# Patient Record
Sex: FEMALE | Race: WHITE | NOT HISPANIC OR LATINO | URBAN - METROPOLITAN AREA
[De-identification: names, ages, dates, MRNs, and addresses within clinical notes are randomized per-mention and may not be internally consistent; named-entity substitution may affect disease eponyms.]

---

## 2017-09-08 ENCOUNTER — APPOINTMENT (RX ONLY)
Dept: URBAN - METROPOLITAN AREA CLINIC 349 | Facility: CLINIC | Age: 77
Setting detail: DERMATOLOGY
End: 2017-09-08

## 2017-09-14 ENCOUNTER — APPOINTMENT (RX ONLY)
Dept: URBAN - METROPOLITAN AREA CLINIC 349 | Facility: CLINIC | Age: 77
Setting detail: DERMATOLOGY
End: 2017-09-14

## 2017-09-14 DIAGNOSIS — L72.8 OTHER FOLLICULAR CYSTS OF THE SKIN AND SUBCUTANEOUS TISSUE: ICD-10-CM

## 2017-09-14 DIAGNOSIS — L90.5 SCAR CONDITIONS AND FIBROSIS OF SKIN: ICD-10-CM

## 2017-09-14 DIAGNOSIS — D22 MELANOCYTIC NEVI: ICD-10-CM

## 2017-09-14 DIAGNOSIS — L82.1 OTHER SEBORRHEIC KERATOSIS: ICD-10-CM

## 2017-09-14 DIAGNOSIS — L72.0 EPIDERMAL CYST: ICD-10-CM

## 2017-09-14 PROBLEM — D22.61 MELANOCYTIC NEVI OF RIGHT UPPER LIMB, INCLUDING SHOULDER: Status: ACTIVE | Noted: 2017-09-14

## 2017-09-14 PROBLEM — D22.72 MELANOCYTIC NEVI OF LEFT LOWER LIMB, INCLUDING HIP: Status: ACTIVE | Noted: 2017-09-14

## 2017-09-14 PROBLEM — D22.5 MELANOCYTIC NEVI OF TRUNK: Status: ACTIVE | Noted: 2017-09-14

## 2017-09-14 PROBLEM — D22.62 MELANOCYTIC NEVI OF LEFT UPPER LIMB, INCLUDING SHOULDER: Status: ACTIVE | Noted: 2017-09-14

## 2017-09-14 PROBLEM — D22.71 MELANOCYTIC NEVI OF RIGHT LOWER LIMB, INCLUDING HIP: Status: ACTIVE | Noted: 2017-09-14

## 2017-09-14 PROCEDURE — ? COUNSELING

## 2017-09-14 PROCEDURE — 10060 I&D ABSCESS SIMPLE/SINGLE: CPT

## 2017-09-14 PROCEDURE — 99203 OFFICE O/P NEW LOW 30 MIN: CPT | Mod: 25

## 2017-09-14 PROCEDURE — ? INCISION AND DRAINAGE

## 2017-09-14 ASSESSMENT — LOCATION SIMPLE DESCRIPTION DERM
LOCATION SIMPLE: RIGHT FOREARM
LOCATION SIMPLE: LEFT UPPER BACK
LOCATION SIMPLE: LEFT POSTERIOR THIGH
LOCATION SIMPLE: LEFT FOREARM
LOCATION SIMPLE: RIGHT POSTERIOR THIGH
LOCATION SIMPLE: ABDOMEN
LOCATION SIMPLE: POSTERIOR SCALP
LOCATION SIMPLE: LEFT ELBOW
LOCATION SIMPLE: NECK
LOCATION SIMPLE: UPPER BACK
LOCATION SIMPLE: CHEST

## 2017-09-14 ASSESSMENT — LOCATION ZONE DERM
LOCATION ZONE: SCALP
LOCATION ZONE: NECK
LOCATION ZONE: ARM
LOCATION ZONE: LEG
LOCATION ZONE: TRUNK

## 2017-09-14 ASSESSMENT — LOCATION DETAILED DESCRIPTION DERM
LOCATION DETAILED: RIGHT PROXIMAL DORSAL FOREARM
LOCATION DETAILED: LEFT CENTRAL LATERAL NECK
LOCATION DETAILED: INFERIOR THORACIC SPINE
LOCATION DETAILED: LEFT SUPERIOR UPPER BACK
LOCATION DETAILED: STERNAL NOTCH
LOCATION DETAILED: RIGHT DISTAL POSTERIOR THIGH
LOCATION DETAILED: LEFT LATERAL ELBOW
LOCATION DETAILED: LEFT PROXIMAL DORSAL FOREARM
LOCATION DETAILED: LEFT OCCIPITAL SCALP
LOCATION DETAILED: LEFT DISTAL LATERAL POSTERIOR THIGH
LOCATION DETAILED: EPIGASTRIC SKIN

## 2017-09-14 NOTE — PROCEDURE: INCISION AND DRAINAGE
Render Postcare In Note?: Yes
Suture Text: The incision was partially closed with
Curette Text (Optional): After the contents were expressed a curette was used to partially remove the cyst wall.
Post-Care Instructions: I reviewed with the patient in detail post-care instructions. Patient should keep wound covered and call the office should any redness, pain, swelling or worsening occur.
Include Sutures?: No
Detail Level: Detailed
Anesthesia Volume In Cc: 0
Consent was obtained and risks were reviewed including but not limited to delayed wound healing, infection, need for multiple I and D's, and pain.
Dressing: pressure dressing
Epidermal Sutures: 4-0 Ethilon
Method: comedo extractor
Preparation Text: The area was prepped in the usual clean fashion.
Lesion Type: Cyst
Epidermal Closure: simple interrupted

## 2018-09-10 ENCOUNTER — APPOINTMENT (RX ONLY)
Dept: URBAN - METROPOLITAN AREA CLINIC 349 | Facility: CLINIC | Age: 78
Setting detail: DERMATOLOGY
End: 2018-09-10

## 2018-09-10 DIAGNOSIS — L82.0 INFLAMED SEBORRHEIC KERATOSIS: ICD-10-CM

## 2018-09-10 DIAGNOSIS — L91.8 OTHER HYPERTROPHIC DISORDERS OF THE SKIN: ICD-10-CM

## 2018-09-10 PROCEDURE — 88305 TISSUE EXAM BY PATHOLOGIST: CPT

## 2018-09-10 PROCEDURE — ? PATHOLOGY BILLING

## 2018-09-10 PROCEDURE — 11301 SHAVE SKIN LESION 0.6-1.0 CM: CPT

## 2018-09-10 PROCEDURE — 99213 OFFICE O/P EST LOW 20 MIN: CPT | Mod: 25

## 2018-09-10 PROCEDURE — ? SHAVE REMOVAL

## 2018-09-10 PROCEDURE — ? COUNSELING

## 2018-09-10 PROCEDURE — A4550 SURGICAL TRAYS: HCPCS

## 2018-09-10 ASSESSMENT — LOCATION DETAILED DESCRIPTION DERM
LOCATION DETAILED: LEFT AXILLARY VAULT
LOCATION DETAILED: LEFT AXILLARY TAIL OF BREAST

## 2018-09-10 ASSESSMENT — LOCATION SIMPLE DESCRIPTION DERM
LOCATION SIMPLE: LEFT AXILLARY VAULT
LOCATION SIMPLE: LEFT BREAST

## 2018-09-10 ASSESSMENT — LOCATION ZONE DERM
LOCATION ZONE: TRUNK
LOCATION ZONE: AXILLAE

## 2018-09-10 NOTE — PROCEDURE: SHAVE REMOVAL
Size Of Lesion In Cm (Required): 0.6
Hemostasis: Aluminum Chloride
Post-Care Instructions: I reviewed with the patient in detail post-care instructions. Patient is to keep the biopsy site dry overnight, and then apply Vaseline daily until healed. Patient may apply hydrogen peroxide soaks to remove any crusting. After the procedure, the patient was oriented to person, place, and time. Patient denied feeling dizzy, queasy, and and declined further observation after initial 5 minute observation time.
Detail Level: Detailed
Render Post-Care Instructions In Note?: yes
Wound Care: Vaseline
Anesthesia Volume In Cc: 1.5
Medical Necessity Clause: This procedure was medically necessary because the lesion has a history of change
Medical Necessity Information: It is in your best interest to select a reason for this procedure from the list below. All of these items fulfill various CMS LCD requirements except the new and changing color options.
Consent was obtained from the patient. The risks and benefits to therapy were discussed in detail. Specifically, the risks of infection, scarring, bleeding, prolonged wound healing, incomplete removal, allergy to anesthesia, nerve injury and recurrence were addressed. Prior to the procedure, the treatment site was clearly identified and confirmed by the patient. All components of Universal Protocol/PAUSE Rule completed.
Anesthesia Type: 1% lidocaine with 1:100,000 epinephrine and a 1:10 solution of 8.4% sodium bicarbonate
Accession #: [id number]
Bill [id number] For Specimen Handling/Conveyance To Laboratory?: no
Biopsy Method: Dermablade
Notification Instructions: Patient will be notified of biopsy results. However, patient instructed to call the office if not contacted within 2 weeks.
X Size Of Lesion In Cm (Optional): 0
Billing Type: Third-Party Bill

## 2018-09-10 NOTE — PROCEDURE: PATHOLOGY BILLING
Immunohistochemistry (28774 and [id number]) billing is not performed here. Please use the Immunohistochemistry Stain Billing plan to accomplish this. Immunohistochemistry ([id number] and [id number]) billing is not performed here. Please use the Immunohistochemistry Stain Billing plan to accomplish this.

## 2018-10-01 ENCOUNTER — APPOINTMENT (RX ONLY)
Dept: URBAN - METROPOLITAN AREA CLINIC 349 | Facility: CLINIC | Age: 78
Setting detail: DERMATOLOGY
End: 2018-10-01

## 2018-10-01 DIAGNOSIS — D22 MELANOCYTIC NEVI: ICD-10-CM | Status: STABLE

## 2018-10-01 DIAGNOSIS — L82.1 OTHER SEBORRHEIC KERATOSIS: ICD-10-CM

## 2018-10-01 PROBLEM — D22.5 MELANOCYTIC NEVI OF TRUNK: Status: ACTIVE | Noted: 2018-10-01

## 2018-10-01 PROCEDURE — ? COUNSELING

## 2018-10-01 PROCEDURE — 99213 OFFICE O/P EST LOW 20 MIN: CPT

## 2018-10-01 ASSESSMENT — LOCATION DETAILED DESCRIPTION DERM
LOCATION DETAILED: LEFT RIB CAGE
LOCATION DETAILED: RIGHT RIB CAGE
LOCATION DETAILED: INFERIOR THORACIC SPINE

## 2018-10-01 ASSESSMENT — LOCATION SIMPLE DESCRIPTION DERM
LOCATION SIMPLE: UPPER BACK
LOCATION SIMPLE: ABDOMEN

## 2018-10-01 ASSESSMENT — LOCATION ZONE DERM: LOCATION ZONE: TRUNK

## 2019-09-10 ENCOUNTER — APPOINTMENT (RX ONLY)
Dept: URBAN - METROPOLITAN AREA CLINIC 349 | Facility: CLINIC | Age: 79
Setting detail: DERMATOLOGY
End: 2019-09-10

## 2019-09-10 DIAGNOSIS — D22 MELANOCYTIC NEVI: ICD-10-CM | Status: STABLE

## 2019-09-10 DIAGNOSIS — L82.1 OTHER SEBORRHEIC KERATOSIS: ICD-10-CM

## 2019-09-10 PROBLEM — D22.5 MELANOCYTIC NEVI OF TRUNK: Status: ACTIVE | Noted: 2019-09-10

## 2019-09-10 PROCEDURE — 99213 OFFICE O/P EST LOW 20 MIN: CPT

## 2019-09-10 PROCEDURE — ? OTHER

## 2019-09-10 PROCEDURE — ? BODY PHOTOGRAPHY

## 2019-09-10 PROCEDURE — ? COUNSELING

## 2019-09-10 ASSESSMENT — LOCATION DETAILED DESCRIPTION DERM
LOCATION DETAILED: RIGHT INFERIOR MEDIAL UPPER BACK
LOCATION DETAILED: RIGHT LATERAL EYEBROW
LOCATION DETAILED: INFERIOR THORACIC SPINE
LOCATION DETAILED: RIGHT RIB CAGE
LOCATION DETAILED: RIGHT SUPERIOR MEDIAL UPPER BACK
LOCATION DETAILED: LEFT RIB CAGE

## 2019-09-10 ASSESSMENT — LOCATION ZONE DERM
LOCATION ZONE: TRUNK
LOCATION ZONE: FACE

## 2019-09-10 ASSESSMENT — LOCATION SIMPLE DESCRIPTION DERM
LOCATION SIMPLE: RIGHT UPPER BACK
LOCATION SIMPLE: UPPER BACK
LOCATION SIMPLE: ABDOMEN
LOCATION SIMPLE: RIGHT EYEBROW

## 2019-09-10 NOTE — PROCEDURE: OTHER
Note Text (......Xxx Chief Complaint.): This diagnosis correlates with the
Other (Free Text): Offered LN2, patient declined
Detail Level: Detailed

## 2019-09-10 NOTE — PROCEDURE: BODY PHOTOGRAPHY
Was The Entire Body Photographed (Cannot Bill Unless Entire Body Photographed)?: No
Reason For Photography: The patient is obtaining body photography to observe existing suspicious moles and or monitor for the appearance of any new lesions.
Number Of Photographs (Optional- Will Not Render If 0): 1
Consent: Written consent obtained, risks reviewed for whole body photography. Patient understands that photograph costs may not be covered by insurance, and patient is ultimately responsible for payment.
Detail Level: Zone
Whole Body Statement: The whole body was photographed today.

## 2020-09-10 ENCOUNTER — APPOINTMENT (RX ONLY)
Dept: URBAN - METROPOLITAN AREA CLINIC 349 | Facility: CLINIC | Age: 80
Setting detail: DERMATOLOGY
End: 2020-09-10

## 2020-09-10 DIAGNOSIS — D22 MELANOCYTIC NEVI: ICD-10-CM | Status: STABLE

## 2020-09-10 DIAGNOSIS — L82.1 OTHER SEBORRHEIC KERATOSIS: ICD-10-CM

## 2020-09-10 PROBLEM — D22.5 MELANOCYTIC NEVI OF TRUNK: Status: ACTIVE | Noted: 2020-09-10

## 2020-09-10 PROCEDURE — ? OTHER

## 2020-09-10 PROCEDURE — ? COUNSELING

## 2020-09-10 PROCEDURE — 99213 OFFICE O/P EST LOW 20 MIN: CPT

## 2020-09-10 PROCEDURE — ? MEDICAL PHOTOGRAPHY REVIEW

## 2020-09-10 ASSESSMENT — LOCATION ZONE DERM: LOCATION ZONE: TRUNK

## 2020-09-10 ASSESSMENT — LOCATION SIMPLE DESCRIPTION DERM
LOCATION SIMPLE: RIGHT UPPER BACK
LOCATION SIMPLE: CHEST
LOCATION SIMPLE: UPPER BACK

## 2020-09-10 ASSESSMENT — LOCATION DETAILED DESCRIPTION DERM
LOCATION DETAILED: INFERIOR THORACIC SPINE
LOCATION DETAILED: RIGHT INFERIOR MEDIAL UPPER BACK
LOCATION DETAILED: LOWER STERNUM

## 2020-09-10 NOTE — PROCEDURE: OTHER
Other (Free Text): offered LN2, patient declined.
Detail Level: Detailed
Note Text (......Xxx Chief Complaint.): This diagnosis correlates with the

## 2021-09-13 ENCOUNTER — APPOINTMENT (RX ONLY)
Dept: URBAN - METROPOLITAN AREA CLINIC 349 | Facility: CLINIC | Age: 81
Setting detail: DERMATOLOGY
End: 2021-09-13

## 2021-09-13 DIAGNOSIS — L82.1 OTHER SEBORRHEIC KERATOSIS: ICD-10-CM

## 2021-09-13 DIAGNOSIS — L57.8 OTHER SKIN CHANGES DUE TO CHRONIC EXPOSURE TO NONIONIZING RADIATION: ICD-10-CM

## 2021-09-13 DIAGNOSIS — D22 MELANOCYTIC NEVI: ICD-10-CM | Status: STABLE

## 2021-09-13 PROBLEM — I10 ESSENTIAL (PRIMARY) HYPERTENSION: Status: ACTIVE | Noted: 2021-09-13

## 2021-09-13 PROBLEM — K21.9 GASTRO-ESOPHAGEAL REFLUX DISEASE WITHOUT ESOPHAGITIS: Status: ACTIVE | Noted: 2021-09-13

## 2021-09-13 PROBLEM — D22.5 MELANOCYTIC NEVI OF TRUNK: Status: ACTIVE | Noted: 2021-09-13

## 2021-09-13 PROCEDURE — 99213 OFFICE O/P EST LOW 20 MIN: CPT

## 2021-09-13 PROCEDURE — ? SUNSCREEN RECOMMENDATIONS

## 2021-09-13 PROCEDURE — ? MEDICAL PHOTOGRAPHY REVIEW

## 2021-09-13 PROCEDURE — ? COUNSELING

## 2021-09-13 ASSESSMENT — LOCATION DETAILED DESCRIPTION DERM
LOCATION DETAILED: RIGHT INFERIOR MEDIAL UPPER BACK
LOCATION DETAILED: RIGHT RIB CAGE
LOCATION DETAILED: INFERIOR THORACIC SPINE

## 2021-09-13 ASSESSMENT — LOCATION ZONE DERM: LOCATION ZONE: TRUNK

## 2021-09-13 ASSESSMENT — LOCATION SIMPLE DESCRIPTION DERM
LOCATION SIMPLE: ABDOMEN
LOCATION SIMPLE: RIGHT UPPER BACK
LOCATION SIMPLE: UPPER BACK

## 2022-01-11 ENCOUNTER — HOME HEALTH ADMISSION (OUTPATIENT)
Dept: HOME HEALTH SERVICES | Facility: HOME HEALTH | Age: 82
End: 2022-01-11
Payer: MEDICARE

## 2022-01-11 ENCOUNTER — HOSPITAL ENCOUNTER (OUTPATIENT)
Dept: REHABILITATION | Age: 82
Discharge: HOME | End: 2022-01-11
Payer: MEDICARE

## 2022-01-11 ENCOUNTER — HOSPITAL ENCOUNTER (OUTPATIENT)
Dept: SURGERY | Age: 82
Discharge: HOME | End: 2022-01-11
Payer: MEDICARE

## 2022-01-11 VITALS
DIASTOLIC BLOOD PRESSURE: 69 MMHG | TEMPERATURE: 97.4 F | OXYGEN SATURATION: 99 % | HEART RATE: 70 BPM | HEIGHT: 65 IN | WEIGHT: 140 LBS | SYSTOLIC BLOOD PRESSURE: 155 MMHG | BODY MASS INDEX: 23.32 KG/M2 | RESPIRATION RATE: 18 BRPM

## 2022-01-11 LAB
ANION GAP SERPL CALC-SCNC: 3 MMOL/L (ref 7–16)
APTT PPP: 27.1 SEC (ref 24.1–35.1)
BACTERIA SPEC CULT: NORMAL
BASOPHILS # BLD: 0.1 K/UL (ref 0–0.2)
BASOPHILS NFR BLD: 1 % (ref 0–2)
BUN SERPL-MCNC: 17 MG/DL (ref 8–23)
CALCIUM SERPL-MCNC: 9.5 MG/DL (ref 8.3–10.4)
CHLORIDE SERPL-SCNC: 106 MMOL/L (ref 98–107)
CO2 SERPL-SCNC: 31 MMOL/L (ref 21–32)
CREAT SERPL-MCNC: 0.74 MG/DL (ref 0.6–1)
DIFFERENTIAL METHOD BLD: NORMAL
EOSINOPHIL # BLD: 0.1 K/UL (ref 0–0.8)
EOSINOPHIL NFR BLD: 2 % (ref 0.5–7.8)
ERYTHROCYTE [DISTWIDTH] IN BLOOD BY AUTOMATED COUNT: 12.6 % (ref 11.9–14.6)
EST. AVERAGE GLUCOSE BLD GHB EST-MCNC: 103 MG/DL
GLUCOSE SERPL-MCNC: 94 MG/DL (ref 65–100)
HBA1C MFR BLD: 5.2 % (ref 4.2–6.3)
HCT VFR BLD AUTO: 39.6 % (ref 35.8–46.3)
HGB BLD-MCNC: 12.6 G/DL (ref 11.7–15.4)
IMM GRANULOCYTES # BLD AUTO: 0 K/UL (ref 0–0.5)
IMM GRANULOCYTES NFR BLD AUTO: 0 % (ref 0–5)
INR PPP: 1
LYMPHOCYTES # BLD: 1.7 K/UL (ref 0.5–4.6)
LYMPHOCYTES NFR BLD: 33 % (ref 13–44)
MCH RBC QN AUTO: 31 PG (ref 26.1–32.9)
MCHC RBC AUTO-ENTMCNC: 31.8 G/DL (ref 31.4–35)
MCV RBC AUTO: 97.5 FL (ref 79.6–97.8)
MONOCYTES # BLD: 0.2 K/UL (ref 0.1–1.3)
MONOCYTES NFR BLD: 5 % (ref 4–12)
NEUTS SEG # BLD: 3 K/UL (ref 1.7–8.2)
NEUTS SEG NFR BLD: 59 % (ref 43–78)
NRBC # BLD: 0 K/UL (ref 0–0.2)
PLATELET # BLD AUTO: 219 K/UL (ref 150–450)
PMV BLD AUTO: 9.8 FL (ref 9.4–12.3)
POTASSIUM SERPL-SCNC: 3.8 MMOL/L (ref 3.5–5.1)
PROTHROMBIN TIME: 13.1 SEC (ref 12.6–14.5)
RBC # BLD AUTO: 4.06 M/UL (ref 4.05–5.2)
SERVICE CMNT-IMP: NORMAL
SODIUM SERPL-SCNC: 140 MMOL/L (ref 136–145)
WBC # BLD AUTO: 5.1 K/UL (ref 4.3–11.1)

## 2022-01-11 PROCEDURE — 83036 HEMOGLOBIN GLYCOSYLATED A1C: CPT

## 2022-01-11 PROCEDURE — 36415 COLL VENOUS BLD VENIPUNCTURE: CPT

## 2022-01-11 PROCEDURE — 93005 ELECTROCARDIOGRAM TRACING: CPT

## 2022-01-11 PROCEDURE — 87641 MR-STAPH DNA AMP PROBE: CPT

## 2022-01-11 PROCEDURE — 80048 BASIC METABOLIC PNL TOTAL CA: CPT

## 2022-01-11 PROCEDURE — 97161 PT EVAL LOW COMPLEX 20 MIN: CPT

## 2022-01-11 PROCEDURE — 94760 N-INVAS EAR/PLS OXIMETRY 1: CPT

## 2022-01-11 PROCEDURE — 85610 PROTHROMBIN TIME: CPT

## 2022-01-11 PROCEDURE — 85025 COMPLETE CBC W/AUTO DIFF WBC: CPT

## 2022-01-11 PROCEDURE — 85730 THROMBOPLASTIN TIME PARTIAL: CPT

## 2022-01-11 PROCEDURE — 77030027138 HC INCENT SPIROMETER -A

## 2022-01-11 RX ORDER — CALCIUM CARBONATE/VITAMIN D3 600 MG-125
2 TABLET ORAL 2 TIMES DAILY
COMMUNITY

## 2022-01-11 NOTE — PROGRESS NOTES
01/11/22 1200   Oxygen Therapy   O2 Sat (%) 99 %   Pulse via Oximetry 76 beats per minute   O2 Device None (Room air)   Pre-Treatment   Breath Sounds Bilateral Clear   Pre FEV1 (liters) 1.9 liters   % Predicted 93     Initial respiratory Assessment completed with pt. Pt was interviewed and evaluated in Joint camp prior to surgery. Patient ID:  Catrachita Crowell  [id number]  28 y.o.  1940  Surgeon: Dr. Loy Coleman  Date of Surgery: 2/3/2022  Procedure: Total Right Knee Arthroplasty  Primary Care Physician: Marquis Santiago [phone]  Specialists:     Pt taught proper COUGH technique  DIAPHRAGMATIC BREATHING EXERCISE INSTRUCTIONS GIVEN    History of smoking:   AS A TEEN                 Quit date:         Secondhand smoke:DENIES    Past procedures with Oxygen desaturation or delayed awakening:DENIES    Past Medical History:   Diagnosis Date    Arthritis     Cancer ([facility] [address] 04/2020    left  lumpectomy    GERD (gastroesophageal reflux disease)     Hypertension     Spontaneous pneumothorax     x 2           \"in my twenties\"      SPONTANEOUS PNEUMOTHORAX X 2  FROM BLEBS- PT HAD SURGERY TO FIX  Respiratory history:DENIES SOB                                                                  Respiratory meds:  DENIES    FAMILY PRESENT:             DAUGHTER  PAST SLEEP STUDY:                       DENIES  HX OF HENOK:                                         DENIES  HENOK assessment:                                               SLEEPS ON SIDE         PHYSICAL EXAM   Body mass index is 23.3 kg/m².    Visit Vitals  BP (!) 155/69 (BP 1 Location: Left upper arm)   Pulse 70   Temp 97.4 °F (36.3 °C)   Resp 18   Ht 5' 5\" (1.651 m)   Wt 63.5 kg (140 lb)   SpO2 99%   BMI 23.30 kg/m²     Neck circumference:   32.5   cm    Loud snoring:                                                       DENIES  Witnessed apnea or wakening gasping or choking:        DENIES       Awakens with headaches: DENIES  Morning or daytime tiredness/ sleepiness:                           TIRED  Dry mouth or sore throat in morning:     SOME                                   Valdez stage:  2                                   SACS score:2  Stop Bang   STOP-BANG  Does the patient snore loudly (louder than talking or loud enough to be heard through closed doors)?: No  Does the patient often feel tired, fatigued, or sleepy during the daytime, even after a \"good\" night's sleep?: Yes  Has anyone ever observed the patient stop breathing during their sleep? : No  Does the patient have or are they being treated for high blood pressure?: Yes  Is the patient's BMI greater than 35?: No  Is your neck circumference greater than 17 inches (Male) or 16 inches (Female)?: No  Is the patient older than 48?: Yes  Is the patient male?: No  HENOK Score: 3  Has the patient been referred to Sleep Medicine?: No  Has the patient previously been diagnosed with Obstructive Sleep Apnea?: No                            CS [facility]  RESPIRATORY ASSESSMENT Q SHIFT   O2 PRN    ALBUTEROL  NEBULIZER Q6 PRN WHEEZING                                            Referrals:    Pt. Phone Number:

## 2022-01-11 NOTE — PROGRESS NOTES
Daniela Noah  : [birth year](4 y.o.) Joint Jessica Rasmussen at [address], [address] [place] [address]  [phone]       Physical Therapy Prehab Plan of Treatment and Evaluation Summary:2022    ICD-10: Treatment Diagnosis:   · Pain in Right Knee (M25.561)  · Stiffness of Right Knee, Not elsewhere classified (M25.661)  · Difficulty in walking, Not elsewhere classified (R26.2)  Precautions/Allergies:   Patient has no known allergies. MEDICAL/REFERRING DIAGNOSIS:  Unspecified acquired deformity of right lower leg [M21.961]  Unilateral primary osteoarthritis, right knee [M17.11]  REFERRING PHYSICIAN: Geovanna Cast,*  DATE OF SURGERY: 2/3/22    Assessment:   Comments:  Patient presents prior to R TKA. Patient      PROBLEM LIST (Impacting functional limitations):  Ms. Karla Wilkerson presents with the following right lower extremity(s) problems:  1. Strength  2. Home Exercise Program  3. Pain   INTERVENTIONS PLANNED:  1. Home Exercise Program  2. Educational Discussion      TREATMENT PLAN: Effective Dates: 2022 TO 2022. Frequency/Duration: Patient to continue to perform home exercise program at least twice per day up until her surgery. GOALS: (Goals have been discussed and agreed upon with patient.)  Discharge Goals: Time Frame: 1 Day  1. Patient will demonstrate independence with a home exercise program designed to increase strength, range of motion, balance, coordination, functional technique and pain control to minimize functional deficits and optimize patient for total joint replacement. Rehabilitation Potential For Stated Goals: Good  Regarding Jessica Burgess's therapy, I certify that the treatment plan above will be carried out by a therapist or under their direction.   Thank you for this referral,  Ivory Moncada, PT               HISTORY:   Present Symptoms:  Pain Intensity 1: 2   History of Present Injury/Illness (Reason for Referral):  Medical/Referring Diagnosis: Unspecified acquired deformity of right lower leg [M21.961]  Unilateral primary osteoarthritis, right knee [M17.11]   Past Medical History/Comorbidities:   Ms. Scottie Camarillo  has a past medical history of Arthritis, Cancer ([address] (04/2020), GERD (gastroesophageal reflux disease), Hypertension, and Spontaneous pneumothorax. She has no past medical history of Malignant hyperthermia due to anesthesia or Pseudocholinesterase deficiency. Ms. Scottie Camarillo  has a past surgical history that includes hx breast lumpectomy (Left, 04/2020); hx orthopaedic (Left); hx colonoscopy; and hx tonsillectomy.   Social History/Living Environment:   Home Environment: Private residence  # Steps to Enter: 0  One/Two Story Residence: One story  Living Alone: Yes  Support Systems: Child(serene)  Patient Expects to be Discharged to[de-identified] [employer]  Current DME Used/Available at Home: None  Tub or Shower Type: Shower    Work/Activity:  retired  Dominant Side:  RIGHT  Current Medications:  See [address] note   Number of Personal Factors/Comorbidities that affect the Plan of Care: 0: LOW COMPLEXITY   EXAMINATION:   ADLs (Current Functional Status):   Ambulation:  [x] Independent  [] Walk Indoors Only  [] Walk Outdoors  [] Use Assistive Device  [] Use Wheelchair Only Dressing:  [x] Independent  Requires Assistance from Someone for:  [] Sock/Shoes  [] Pants  [] Everything   Bathing/Showering:   [x] Independent  [] Requires Assistance from Someone  [] [address]:  [x] Routine house and yard work  [] Light Housework Only  [] None   Observation/Orthostatic Postural Assessment:       ROM/Flexibility:   AROM: Generally decreased, functional                       RLE AROM  R Knee Flexion: 119  R Knee Extension: 0   Strength:   Strength: Generally decreased, functional              RLE Strength  R Hip Flexion: 4  R Knee Flexion: 4+  R Knee Extension: 4+   Functional Mobility:    Coordination: Within functional limits    Stand to Sit: Independent  Sit to Stand: Independent  Distance (ft): 200 Feet (ft)  Ambulation - Level of Assistance: Independent  Stance: Right decreased  Gait Abnormalities: Antalgic          Balance:    Sitting: Intact  Standing: Intact   Body Structures Involved:  1. Joints  2. Muscles Body Functions Affected:  1. Movement Related Activities and Participation Affected:  1. Mobility   Number of elements that affect the Plan of Care: 4+: HIGH COMPLEXITY   CLINICAL PRESENTATION:   Presentation: Stable and uncomplicated: LOW COMPLEXITY   CLINICAL DECISION MAKING:   Tool Used: Knee injury and Osteoarthritis Outcome Score for Joint Replacement (KOOS, JR)  Score:  Initial: 8 (Interval: 65.994) 1/11/2022 Most Recent: TBD   Interpretation of Score: The KOOS, JR contains 7 items from the original KOOS survey. Items are coded from 0 to 4, none to extreme respectively. [place] is scored by summing the raw response (range 0-28) and then converting it to an interval score using the table provided below. The interval score ranges from 0 to 100 where 0 represents total knee disability and 100 represents perfect knee health. Medical Necessity:   · Ms. Tamra Valdez is expected to optimize her lower extremity strength and ROM in preparation for joint replacement surgery. Reason for Services/Other Comments:  · Achieve baseline assesment of musculoskeletal system, functional mobility and home environment. , educate in PT HEP in preparation for surgery, educate in hospital plan of care. Use of outcome tool(s) and clinical judgement create a POC that gives a: Clear prediction of patient's progress: LOW COMPLEXITY   TREATMENT:   Treatment/Session Assessment:  Patient was instructed in PT- HEP to increase strength and ROM in LEs. Answered all questions. · Post session pain:  2/10  · Compliance with Program/Exercises: Will assess as treatment progresses.   Total Treatment Duration:  PT Patient Time In/Time Out  Time In: 1200  Time Out: 401 [employer] [facility]

## 2022-01-11 NOTE — PERIOP NOTES
PLEASE CONTINUE TAKING ALL PRESCRIPTION MEDICATIONS UP TO THE DAY OF SURGERY UNLESS OTHERWISE DIRECTED BELOW. DISCONTINUE all vitamins and supplements 21 days prior to surgery. DISCONTINUE Non-Steriodal Anti-Inflammatory (NSAIDS) such as Advil and Aleve 5 days prior to surgery. Home Medications to take  the day of surgery   Omeprazole  Tamoxifen            Home Medications   to Hold   None        Comments    Covid test 01/31/2022 @ 2:00           [address] [facility]    On the day before Wednesday 02/02/22 surgery please take Acetaminophen 1000mg in the morning and then again before bed. You may substitute for Tylenol 650 mg. Please bring bottle of soap (Dynahex) and incentive spirometer to the hospital on the day of surgery. Please do not bring home medications with you on the day of surgery unless otherwise directed by your nurse. If you are instructed to bring home medications, please give them to your nurse as they will be administered by the nursing staff. If you have any questions, please call [facility] [phone]  A copy of this note was provided to the patient for reference.

## 2022-01-11 NOTE — PROGRESS NOTES
Joint Camp Case Management note:  Patient screened in Prehab for discharge planning needs. Patient scheduled for a future total joint replacement. We discussed Home Health and equipment needed after surgery. List of Home Health providers offered. Patient w/o preference towards provider. Initial referral sent to [facility]. Will need walker and maybe a 3-1 BSC. She will let us know.

## 2022-01-11 NOTE — PERIOP NOTES
Patient verified name and . Order for consent was found in EHR and matches case posting; patient verified. Type 3 surgery, PAT joint camp assessment complete. Labs per surgeon: CBC,BMP, PT/PTT, HgbA1c and MRSA swab ; results within anesthesia guidelines  Labs per anesthesia protocol: no additional  EKG:done today and within anesthesia guidelines    Patient COVID test date 2022 @ 2:00; Patient aware. The testing center [facility] [place]  and telephone number [phone] provided to the patient if not appointment found. MRSA/MSSA swab collected; pharmacy to review and dose antibiotic as appropriate. Hospital approved surgical skin cleanser and instructions to return bottle on DOS given per hospital policy. Patient provided with handouts including Guide to Surgery, Pain Management, Hand Hygiene, Blood Transfusion Education, and [facility] Brochure. Patient answered medical/surgical history questions at their best of ability. All prior to admission medications documented in [facility]. Original medication prescription bottle was visualized during patient appointment. Patient instructed to hold all vitamins 3 weeks prior to surgery and NSAIDS 5 days prior to surgery. Patient teach back successful and patient demonstrates knowledge of instruction.

## 2022-01-11 NOTE — PERIOP NOTES
Lab results routed to pt's PCP, Dr. Zoraida Hope Results (from the past 12 hour(s))   CBC WITH AUTOMATED DIFF    Collection Time: 01/11/22 12:45 PM   Result Value Ref Range    WBC 5.1 4.3 - 11.1 K/uL    RBC 4.06 4.05 - 5.2 M/uL    HGB 12.6 11.7 - 15.4 g/dL    HCT 39.6 35.8 - 46.3 %    MCV 97.5 79.6 - 97.8 FL    MCH 31.0 26.1 - 32.9 PG    MCHC 31.8 31.4 - 35.0 g/dL    RDW 12.6 11.9 - 14.6 %    PLATELET 814 405 - 865 K/uL    MPV 9.8 9.4 - 12.3 FL    ABSOLUTE NRBC 0.00 0.0 - 0.2 K/uL    DF AUTOMATED      NEUTROPHILS 59 43 - 78 %    LYMPHOCYTES 33 13 - 44 %    MONOCYTES 5 4.0 - 12.0 %    EOSINOPHILS 2 0.5 - 7.8 %    BASOPHILS 1 0.0 - 2.0 %    IMMATURE GRANULOCYTES 0 0.0 - 5.0 %    ABS. NEUTROPHILS 3.0 1.7 - 8.2 K/UL    ABS. LYMPHOCYTES 1.7 0.5 - 4.6 K/UL    ABS. MONOCYTES 0.2 0.1 - 1.3 K/UL    ABS. EOSINOPHILS 0.1 0.0 - 0.8 K/UL    ABS. BASOPHILS 0.1 0.0 - 0.2 K/UL    ABS. IMM.  GRANS. 0.0 0.0 - 0.5 K/UL   HEMOGLOBIN A1C WITH EAG    Collection Time: 01/11/22 12:45 PM   Result Value Ref Range    Hemoglobin A1c 5.2 4.20 - 6.30 %    Est. average glucose 906 mg/dL   METABOLIC PANEL, BASIC    Collection Time: 01/11/22 12:45 PM   Result Value Ref Range    Sodium 140 136 - 145 mmol/L    Potassium 3.8 3.5 - 5.1 mmol/L    Chloride 106 98 - 107 mmol/L    CO2 31 21 - 32 mmol/L    Anion gap 3 (L) 7 - 16 mmol/L    Glucose 94 65 - 100 mg/dL    BUN 17 8 - 23 MG/DL    Creatinine 0.74 0.6 - 1.0 MG/DL    GFR est AA >60 >60 ml/min/1.73m2    GFR est non-AA >60 >60 ml/min/1.73m2    Calcium 9.5 8.3 - 10.4 MG/DL   PROTHROMBIN TIME + INR    Collection Time: 01/11/22 12:45 PM   Result Value Ref Range    Prothrombin time 13.1 12.6 - 14.5 sec    INR 1.0     PTT    Collection Time: 01/11/22 12:45 PM   Result Value Ref Range    aPTT 27.1 24.1 - 35.1 SEC

## 2022-01-12 LAB
ATRIAL RATE: 66 BPM
CALCULATED P AXIS, ECG09: 36 DEGREES
CALCULATED R AXIS, ECG10: 83 DEGREES
CALCULATED T AXIS, ECG11: 91 DEGREES
DIAGNOSIS, 93000: NORMAL
P-R INTERVAL, ECG05: 138 MS
Q-T INTERVAL, ECG07: 420 MS
QRS DURATION, ECG06: 92 MS
QTC CALCULATION (BEZET), ECG08: 440 MS
VENTRICULAR RATE, ECG03: 66 BPM

## 2022-01-12 NOTE — ADVANCED PRACTICE NURSE
Total Joint Surgery Preoperative Chart Review      Patient ID:  Deya Ruiz  [id number]  76 y.o.  1940  Surgeon: Dr. Akira Das  Date of Surgery: 2/3/2022  Procedure: Total Right Knee Arthroplasty  Primary Care Physician: Casi Santillan [phone]  Specialty Physician(s):      Subjective:   Deya Ruiz is a 79 y.o. WHITE/NON- female who presents for preoperative evaluation for Total Right Knee arthroplasty. This is a preoperative chart review note based on data collected by the nurse at the surgical Pre-Assessment visit. Past Medical History:   Diagnosis Date    Arthritis     Cancer ([facility] [address] 04/2020    left  lumpectomy    GERD (gastroesophageal reflux disease)     Hypertension     Spontaneous pneumothorax     x 2           \"in my twenties\"      Past Surgical History:   Procedure Laterality Date    HX BREAST LUMPECTOMY Left 04/2020    HX COLONOSCOPY      HX ORTHOPAEDIC Left     ruptured tendon repair    HX TONSILLECTOMY      as a child     History reviewed. No pertinent family history. Social History     Tobacco Use    Smoking status: Former Smoker     Types: Cigarettes    Smokeless tobacco: Never Used    Tobacco comment: smoked in [place]   Substance Use Topics    Alcohol use: Yes     Comment: occasional glass of wine       Prior to Admission medications    Medication Sig Start Date End Date Taking? Authorizing Provider   calcium-cholecalciferol, d3, (CALCIUM 600 + D) 600-125 mg-unit tab Take 2 Tablets by mouth two (2) times a day. Yes Provider, Historical   Lactobac no.41/Bifidobact no.7 (PROBIOTIC-10 PO) Take  by mouth daily. Yes Provider, Historical   amLODIPine-benazepril (LOTREL) 5-40 mg per capsule Take 1 Capsule by mouth. Yes Provider, Historical   therapeutic multivitamin (THERAGRAN) tablet Take 1 Tablet by mouth daily. Yes Provider, Historical   omeprazole (PRILOSEC) 20 mg capsule Take 20 mg by mouth daily.  8/27/21  Yes Provider, Historical   tamoxifen (NOLVADEX) 20 mg tablet Take 20 mg by mouth daily. 11/10/21  Yes Provider, Historical   diclofenac (VOLTAREN) 1 % gel Apply 2 g to affected area four (4) times daily for 113 days. 11/29/21 3/22/22  Maylin Byers MD     No Known Allergies       Objective:     Physical Exam:   Patient Vitals for the past 24 hrs:   Temp Pulse Resp BP SpO2   01/11/22 1336 97.4 °F (36.3 °C) 70 18 (!) 155/69 99 %   01/11/22 1200     99 %       ECG:    EKG Results     Procedure 720 Value Units Date/Time    EKG, 12 LEAD, INITIAL [[id number]] Collected: 01/11/22 1410    Order Status: Completed Updated: 01/12/22 0659     Ventricular Rate 66 BPM      Atrial Rate 66 BPM      P-R Interval 138 ms      QRS Duration 92 ms      Q-T Interval 420 ms      QTC Calculation (Bezet) 440 ms      Calculated P Axis 36 degrees      Calculated R Axis 83 degrees      Calculated T Axis 91 degrees      Diagnosis --     Normal sinus rhythm  Normal ECG  No previous ECGs available            Data Review:   Labs:   Recent Results (from the past 24 hour(s))   CBC WITH AUTOMATED DIFF    Collection Time: 01/11/22 12:45 PM   Result Value Ref Range    WBC 5.1 4.3 - 11.1 K/uL    RBC 4.06 4.05 - 5.2 M/uL    HGB 12.6 11.7 - 15.4 g/dL    HCT 39.6 35.8 - 46.3 %    MCV 97.5 79.6 - 97.8 FL    MCH 31.0 26.1 - 32.9 PG    MCHC 31.8 31.4 - 35.0 g/dL    RDW 12.6 11.9 - 14.6 %    PLATELET 916 291 - 304 K/uL    MPV 9.8 9.4 - 12.3 FL    ABSOLUTE NRBC 0.00 0.0 - 0.2 K/uL    DF AUTOMATED      NEUTROPHILS 59 43 - 78 %    LYMPHOCYTES 33 13 - 44 %    MONOCYTES 5 4.0 - 12.0 %    EOSINOPHILS 2 0.5 - 7.8 %    BASOPHILS 1 0.0 - 2.0 %    IMMATURE GRANULOCYTES 0 0.0 - 5.0 %    ABS. NEUTROPHILS 3.0 1.7 - 8.2 K/UL    ABS. LYMPHOCYTES 1.7 0.5 - 4.6 K/UL    ABS. MONOCYTES 0.2 0.1 - 1.3 K/UL    ABS. EOSINOPHILS 0.1 0.0 - 0.8 K/UL    ABS. BASOPHILS 0.1 0.0 - 0.2 K/UL    ABS. IMM.  GRANS. 0.0 0.0 - 0.5 K/UL   HEMOGLOBIN A1C WITH EAG    Collection Time: 01/11/22 12:45 PM   Result Value Ref Range    Hemoglobin A1c 5.2 4.20 - 6.30 %    Est. average glucose 645 mg/dL   METABOLIC PANEL, BASIC    Collection Time: 01/11/22 12:45 PM   Result Value Ref Range    Sodium 140 136 - 145 mmol/L    Potassium 3.8 3.5 - 5.1 mmol/L    Chloride 106 98 - 107 mmol/L    CO2 31 21 - 32 mmol/L    Anion gap 3 (L) 7 - 16 mmol/L    Glucose 94 65 - 100 mg/dL    BUN 17 8 - 23 MG/DL    Creatinine 0.74 0.6 - 1.0 MG/DL    GFR est AA >60 >60 ml/min/1.73m2    GFR est non-AA >60 >60 ml/min/1.73m2    Calcium 9.5 8.3 - 10.4 MG/DL   PROTHROMBIN TIME + INR    Collection Time: 01/11/22 12:45 PM   Result Value Ref Range    Prothrombin time 13.1 12.6 - 14.5 sec    INR 1.0     PTT    Collection Time: 01/11/22 12:45 PM   Result Value Ref Range    aPTT 27.1 24.1 - 35.1 SEC   EKG, 12 LEAD, INITIAL    Collection Time: 01/11/22  2:10 PM   Result Value Ref Range    Ventricular Rate 66 BPM    Atrial Rate 66 BPM    P-R Interval 138 ms    QRS Duration 92 ms    Q-T Interval 420 ms    QTC Calculation (Bezet) 440 ms    Calculated P Axis 36 degrees    Calculated R Axis 83 degrees    Calculated T Axis 91 degrees    Diagnosis       Normal sinus rhythm  Normal ECG  No previous ECGs available     MSSA/MRSA SC BY PCR, NASAL SWAB    Collection Time: 01/11/22  2:26 PM    Specimen: Nasal swab   Result Value Ref Range    Special Requests: Nasopharyngeal      Culture result:        SA target not detected. A MRSA NEGATIVE, SA NEGATIVE test result does not preclude MRSA or SA nasal colonization. Problem List:  )There is no problem list on file for this patient. Total Joint Surgery Pre-Assessment Recommendations:           Continuous saturation monitoring during hospitalization. O2 prn per respiratory protocol.      Signed By: RAMON Chin    January 12, 2022

## 2022-01-31 NOTE — H&P
[address] [facility]  History and Physical Exam    Patient ID:  Mark Singh  [id number]    82 y.o.  1940    Today: January 31, 2022    Vitals Signs: Reviewed as noted in medical record. Allergies: No Known Allergies    CC: Right knee pain    HPI:  Pt complains of right knee pain and difficulty ambulating. Relevant PMH:   Past Medical History:   Diagnosis Date    Arthritis     Cancer ([address] 04/2020    left  lumpectomy    GERD (gastroesophageal reflux disease)     Hypertension     Spontaneous pneumothorax     x 2           \"in my twenties\"       Objective:                    HEENT: NC/AT                   Lungs:  clear                   Heart:   rrr                   Abdomen: soft                   Extremities:  Pain with rom of the right knee joint    Radiographs: reveal right knee osteoarthritis with loss of joint space and bone spurs. Assessment: Acquired deformity of right knee [M21.961]  Primary osteoarthritis of right knee [M17.11]    Plan:  Proceed with scheduled Procedure(s) (LRB):  RIGHT AMELIA KNEE ARTHROPLASTY TOTAL ROBOTIC ASSISTED/ JOSE M (Right) . The patient has failed conservative treatment including NSAIDS, and injections. Due to the amount of pain the patient is experiencing we will proceed with scheduled procedure. Will plan for same day discharge.     Signed By: DONITA Fernandez  January 31, 2022

## 2022-02-02 ENCOUNTER — ANESTHESIA EVENT (OUTPATIENT)
Dept: SURGERY | Age: 82
End: 2022-02-02
Payer: MEDICARE

## 2022-02-03 ENCOUNTER — HOSPITAL ENCOUNTER (OUTPATIENT)
Age: 82
Setting detail: OUTPATIENT SURGERY
Discharge: HOME HEALTH | End: 2022-02-03
Attending: ORTHOPAEDIC SURGERY | Admitting: ORTHOPAEDIC SURGERY
Payer: MEDICARE

## 2022-02-03 ENCOUNTER — ANESTHESIA (OUTPATIENT)
Dept: SURGERY | Age: 82
End: 2022-02-03
Payer: MEDICARE

## 2022-02-03 VITALS
TEMPERATURE: 97.9 F | DIASTOLIC BLOOD PRESSURE: 79 MMHG | BODY MASS INDEX: 23.11 KG/M2 | OXYGEN SATURATION: 94 % | RESPIRATION RATE: 16 BRPM | WEIGHT: 138.9 LBS | SYSTOLIC BLOOD PRESSURE: 127 MMHG | HEART RATE: 78 BPM

## 2022-02-03 DIAGNOSIS — Z96.651 STATUS POST TOTAL KNEE REPLACEMENT, RIGHT: Primary | ICD-10-CM

## 2022-02-03 DIAGNOSIS — M17.11 ARTHRITIS OF RIGHT KNEE: ICD-10-CM

## 2022-02-03 PROCEDURE — 74011250637 HC RX REV CODE- 250/637: Performed by: PHYSICIAN ASSISTANT

## 2022-02-03 PROCEDURE — 76010000875 HC OR TIME 1.5 TO 2HR INTENSV - TIER 2: Performed by: ORTHOPAEDIC SURGERY

## 2022-02-03 PROCEDURE — 27447 TOTAL KNEE ARTHROPLASTY: CPT | Performed by: PHYSICIAN ASSISTANT

## 2022-02-03 PROCEDURE — 77030012935 HC DRSG AQUACEL BMS -B: Performed by: ORTHOPAEDIC SURGERY

## 2022-02-03 PROCEDURE — C1776 JOINT DEVICE (IMPLANTABLE): HCPCS | Performed by: ORTHOPAEDIC SURGERY

## 2022-02-03 PROCEDURE — 74011000250 HC RX REV CODE- 250: Performed by: ANESTHESIOLOGY

## 2022-02-03 PROCEDURE — 74011000258 HC RX REV CODE- 258: Performed by: ORTHOPAEDIC SURGERY

## 2022-02-03 PROCEDURE — 77030018836 HC SOL IRR NACL ICUM -A: Performed by: ORTHOPAEDIC SURGERY

## 2022-02-03 PROCEDURE — 77030029828 HC FEM TIB CKPNT KT DISP STRY -B: Performed by: ORTHOPAEDIC SURGERY

## 2022-02-03 PROCEDURE — 77030019557 HC ELECTRD VES SEAL MEDT -F: Performed by: ORTHOPAEDIC SURGERY

## 2022-02-03 PROCEDURE — 97535 SELF CARE MNGMENT TRAINING: CPT

## 2022-02-03 PROCEDURE — 74011250636 HC RX REV CODE- 250/636: Performed by: ORTHOPAEDIC SURGERY

## 2022-02-03 PROCEDURE — 77030003602 HC NDL NRV BLK BBMI -B: Performed by: ANESTHESIOLOGY

## 2022-02-03 PROCEDURE — 76060000034 HC ANESTHESIA 1.5 TO 2 HR: Performed by: ORTHOPAEDIC SURGERY

## 2022-02-03 PROCEDURE — 77030003665 HC NDL SPN BBMI -A: Performed by: ANESTHESIOLOGY

## 2022-02-03 PROCEDURE — 77030002966 HC SUT PDS J&J -A: Performed by: ORTHOPAEDIC SURGERY

## 2022-02-03 PROCEDURE — 76942 ECHO GUIDE FOR BIOPSY: CPT | Performed by: ORTHOPAEDIC SURGERY

## 2022-02-03 PROCEDURE — 94760 N-INVAS EAR/PLS OXIMETRY 1: CPT

## 2022-02-03 PROCEDURE — 77030008462 HC STPLR SKN PROX J&J -A: Performed by: ORTHOPAEDIC SURGERY

## 2022-02-03 PROCEDURE — 77030031139 HC SUT VCRL2 J&J -A: Performed by: ORTHOPAEDIC SURGERY

## 2022-02-03 PROCEDURE — 74011000250 HC RX REV CODE- 250: Performed by: PHYSICIAN ASSISTANT

## 2022-02-03 PROCEDURE — 97165 OT EVAL LOW COMPLEX 30 MIN: CPT

## 2022-02-03 PROCEDURE — 74011000250 HC RX REV CODE- 250: Performed by: ORTHOPAEDIC SURGERY

## 2022-02-03 PROCEDURE — 76010010054 HC POST OP PAIN BLOCK: Performed by: ORTHOPAEDIC SURGERY

## 2022-02-03 PROCEDURE — 20985 CPTR-ASST DIR MS PX: CPT | Performed by: ORTHOPAEDIC SURGERY

## 2022-02-03 PROCEDURE — 97161 PT EVAL LOW COMPLEX 20 MIN: CPT

## 2022-02-03 PROCEDURE — 77030039760: Performed by: ORTHOPAEDIC SURGERY

## 2022-02-03 PROCEDURE — 77030007880 HC KT SPN EPDRL BBMI -B: Performed by: ANESTHESIOLOGY

## 2022-02-03 PROCEDURE — C1713 ANCHOR/SCREW BN/BN,TIS/BN: HCPCS | Performed by: ORTHOPAEDIC SURGERY

## 2022-02-03 PROCEDURE — 77030038149 HC BLD SAW SAG STRY -D: Performed by: ORTHOPAEDIC SURGERY

## 2022-02-03 PROCEDURE — 97530 THERAPEUTIC ACTIVITIES: CPT

## 2022-02-03 PROCEDURE — 74011250636 HC RX REV CODE- 250/636: Performed by: ANESTHESIOLOGY

## 2022-02-03 PROCEDURE — 27447 TOTAL KNEE ARTHROPLASTY: CPT | Performed by: ORTHOPAEDIC SURGERY

## 2022-02-03 PROCEDURE — 76210000063 HC OR PH I REC FIRST 0.5 HR: Performed by: ORTHOPAEDIC SURGERY

## 2022-02-03 PROCEDURE — 74011250636 HC RX REV CODE- 250/636: Performed by: PHYSICIAN ASSISTANT

## 2022-02-03 PROCEDURE — 74011250637 HC RX REV CODE- 250/637: Performed by: ANESTHESIOLOGY

## 2022-02-03 PROCEDURE — 77030020044 HC CLD THERAPY UNIT -B

## 2022-02-03 PROCEDURE — 2709999900 HC NON-CHARGEABLE SUPPLY: Performed by: ORTHOPAEDIC SURGERY

## 2022-02-03 PROCEDURE — 77030002933 HC SUT MCRYL J&J -A: Performed by: ORTHOPAEDIC SURGERY

## 2022-02-03 PROCEDURE — 77030029820: Performed by: ORTHOPAEDIC SURGERY

## 2022-02-03 PROCEDURE — 77030040922 HC BLNKT HYPOTHRM STRY -A: Performed by: ANESTHESIOLOGY

## 2022-02-03 DEVICE — KNEE K2 TOT HEMI ADV CMTLS -- IMPL CAPPED K2: Type: IMPLANTABLE DEVICE | Status: FUNCTIONAL

## 2022-02-03 DEVICE — PATELLA
Type: IMPLANTABLE DEVICE | Site: KNEE | Status: FUNCTIONAL
Brand: TRIATHLON

## 2022-02-03 DEVICE — INSERT TIB CS 3 10 MM ARTC KNEE BEAR TECHNOLOGY TRIATHLON: Type: IMPLANTABLE DEVICE | Site: KNEE | Status: FUNCTIONAL

## 2022-02-03 DEVICE — CRUCIATE RETAINING FEMORAL
Type: IMPLANTABLE DEVICE | Site: KNEE | Status: FUNCTIONAL
Brand: TRIATHLON

## 2022-02-03 DEVICE — TIBIAL COMPONENT
Type: IMPLANTABLE DEVICE | Site: KNEE | Status: FUNCTIONAL
Brand: TRIATHLON

## 2022-02-03 DEVICE — DEPUY CMW 2 FAST SET BONE CEMENT 20G: Type: IMPLANTABLE DEVICE | Site: KNEE | Status: FUNCTIONAL

## 2022-02-03 RX ORDER — HYDROMORPHONE HYDROCHLORIDE 1 MG/ML
1 INJECTION, SOLUTION INTRAMUSCULAR; INTRAVENOUS; SUBCUTANEOUS
Status: DISCONTINUED | OUTPATIENT
Start: 2022-02-03 | End: 2022-02-03 | Stop reason: HOSPADM

## 2022-02-03 RX ORDER — CELECOXIB 200 MG/1
200 CAPSULE ORAL EVERY 12 HOURS
Status: DISCONTINUED | OUTPATIENT
Start: 2022-02-03 | End: 2022-02-03 | Stop reason: HOSPADM

## 2022-02-03 RX ORDER — DIPHENHYDRAMINE HCL 25 MG
25 CAPSULE ORAL
Status: DISCONTINUED | OUTPATIENT
Start: 2022-02-03 | End: 2022-02-03 | Stop reason: HOSPADM

## 2022-02-03 RX ORDER — ALBUTEROL SULFATE 0.83 MG/ML
2.5 SOLUTION RESPIRATORY (INHALATION) AS NEEDED
Status: DISCONTINUED | OUTPATIENT
Start: 2022-02-03 | End: 2022-02-03 | Stop reason: HOSPADM

## 2022-02-03 RX ORDER — ACETAMINOPHEN 650 MG/1
650 SUPPOSITORY RECTAL ONCE
Status: DISCONTINUED | OUTPATIENT
Start: 2022-02-03 | End: 2022-02-03 | Stop reason: SDUPTHER

## 2022-02-03 RX ORDER — NALOXONE HYDROCHLORIDE 0.4 MG/ML
.2-.4 INJECTION, SOLUTION INTRAMUSCULAR; INTRAVENOUS; SUBCUTANEOUS
Status: DISCONTINUED | OUTPATIENT
Start: 2022-02-03 | End: 2022-02-03 | Stop reason: HOSPADM

## 2022-02-03 RX ORDER — EPHEDRINE SULFATE/0.9% NACL/PF 50 MG/5 ML
SYRINGE (ML) INTRAVENOUS AS NEEDED
Status: DISCONTINUED | OUTPATIENT
Start: 2022-02-03 | End: 2022-02-03 | Stop reason: HOSPADM

## 2022-02-03 RX ORDER — ONDANSETRON 2 MG/ML
4 INJECTION INTRAMUSCULAR; INTRAVENOUS
Status: DISCONTINUED | OUTPATIENT
Start: 2022-02-03 | End: 2022-02-03 | Stop reason: HOSPADM

## 2022-02-03 RX ORDER — ONDANSETRON 4 MG/1
4 TABLET, ORALLY DISINTEGRATING ORAL
Status: DISCONTINUED | OUTPATIENT
Start: 2022-02-03 | End: 2022-02-03 | Stop reason: HOSPADM

## 2022-02-03 RX ORDER — MIDAZOLAM HYDROCHLORIDE 1 MG/ML
2 INJECTION, SOLUTION INTRAMUSCULAR; INTRAVENOUS
Status: COMPLETED | OUTPATIENT
Start: 2022-02-03 | End: 2022-02-03

## 2022-02-03 RX ORDER — ASPIRIN 81 MG/1
81 TABLET ORAL EVERY 12 HOURS
Qty: 70 TABLET | Refills: 0 | Status: SHIPPED | OUTPATIENT
Start: 2022-02-03 | End: 2022-03-10

## 2022-02-03 RX ORDER — ACETAMINOPHEN 325 MG/1
975 TABLET ORAL ONCE
Status: DISCONTINUED | OUTPATIENT
Start: 2022-02-03 | End: 2022-02-03 | Stop reason: SDUPTHER

## 2022-02-03 RX ORDER — SODIUM CHLORIDE 0.9 % (FLUSH) 0.9 %
5-40 SYRINGE (ML) INJECTION EVERY 8 HOURS
Status: DISCONTINUED | OUTPATIENT
Start: 2022-02-03 | End: 2022-02-03 | Stop reason: HOSPADM

## 2022-02-03 RX ORDER — CEFAZOLIN SODIUM/WATER 2 G/20 ML
2 SYRINGE (ML) INTRAVENOUS EVERY 8 HOURS
Status: DISCONTINUED | OUTPATIENT
Start: 2022-02-03 | End: 2022-02-03 | Stop reason: HOSPADM

## 2022-02-03 RX ORDER — ONDANSETRON 2 MG/ML
INJECTION INTRAMUSCULAR; INTRAVENOUS AS NEEDED
Status: DISCONTINUED | OUTPATIENT
Start: 2022-02-03 | End: 2022-02-03 | Stop reason: HOSPADM

## 2022-02-03 RX ORDER — AMOXICILLIN 250 MG
2 CAPSULE ORAL DAILY
Status: DISCONTINUED | OUTPATIENT
Start: 2022-02-04 | End: 2022-02-03 | Stop reason: HOSPADM

## 2022-02-03 RX ORDER — ROPIVACAINE HYDROCHLORIDE 2 MG/ML
INJECTION, SOLUTION EPIDURAL; INFILTRATION; PERINEURAL AS NEEDED
Status: DISCONTINUED | OUTPATIENT
Start: 2022-02-03 | End: 2022-02-03 | Stop reason: HOSPADM

## 2022-02-03 RX ORDER — AMLODIPINE AND BENAZEPRIL HYDROCHLORIDE 5; 40 MG/1; MG/1
1 CAPSULE ORAL DAILY
Status: DISCONTINUED | OUTPATIENT
Start: 2022-02-04 | End: 2022-02-03 | Stop reason: HOSPADM

## 2022-02-03 RX ORDER — OXYCODONE HYDROCHLORIDE 5 MG/1
10 TABLET ORAL
Status: DISCONTINUED | OUTPATIENT
Start: 2022-02-03 | End: 2022-02-03 | Stop reason: HOSPADM

## 2022-02-03 RX ORDER — HYDROMORPHONE HYDROCHLORIDE 2 MG/ML
0.5 INJECTION, SOLUTION INTRAMUSCULAR; INTRAVENOUS; SUBCUTANEOUS
Status: DISCONTINUED | OUTPATIENT
Start: 2022-02-03 | End: 2022-02-03 | Stop reason: HOSPADM

## 2022-02-03 RX ORDER — ACETAMINOPHEN 500 MG
1000 TABLET ORAL ONCE
Status: COMPLETED | OUTPATIENT
Start: 2022-02-03 | End: 2022-02-03

## 2022-02-03 RX ORDER — OXYCODONE HYDROCHLORIDE 5 MG/1
5 TABLET ORAL
Status: COMPLETED | OUTPATIENT
Start: 2022-02-03 | End: 2022-02-03

## 2022-02-03 RX ORDER — VANCOMYCIN HYDROCHLORIDE 1 G/20ML
INJECTION, POWDER, LYOPHILIZED, FOR SOLUTION INTRAVENOUS AS NEEDED
Status: DISCONTINUED | OUTPATIENT
Start: 2022-02-03 | End: 2022-02-03 | Stop reason: HOSPADM

## 2022-02-03 RX ORDER — SODIUM CHLORIDE 0.9 % (FLUSH) 0.9 %
5-40 SYRINGE (ML) INJECTION AS NEEDED
Status: DISCONTINUED | OUTPATIENT
Start: 2022-02-03 | End: 2022-02-03 | Stop reason: HOSPADM

## 2022-02-03 RX ORDER — OXYCODONE HYDROCHLORIDE 5 MG/1
5-10 TABLET ORAL
Qty: 60 TABLET | Refills: 0 | Status: SHIPPED | OUTPATIENT
Start: 2022-02-03 | End: 2022-02-08

## 2022-02-03 RX ORDER — TRANEXAMIC ACID 100 MG/ML
INJECTION, SOLUTION INTRAVENOUS AS NEEDED
Status: DISCONTINUED | OUTPATIENT
Start: 2022-02-03 | End: 2022-02-03 | Stop reason: HOSPADM

## 2022-02-03 RX ORDER — PANTOPRAZOLE SODIUM 40 MG/1
40 TABLET, DELAYED RELEASE ORAL
Status: DISCONTINUED | OUTPATIENT
Start: 2022-02-04 | End: 2022-02-03 | Stop reason: HOSPADM

## 2022-02-03 RX ORDER — SODIUM CHLORIDE, SODIUM LACTATE, POTASSIUM CHLORIDE, CALCIUM CHLORIDE 600; 310; 30; 20 MG/100ML; MG/100ML; MG/100ML; MG/100ML
1000 INJECTION, SOLUTION INTRAVENOUS CONTINUOUS
Status: DISCONTINUED | OUTPATIENT
Start: 2022-02-03 | End: 2022-02-03 | Stop reason: HOSPADM

## 2022-02-03 RX ORDER — DEXAMETHASONE SODIUM PHOSPHATE 100 MG/10ML
10 INJECTION INTRAMUSCULAR; INTRAVENOUS ONCE
Status: DISCONTINUED | OUTPATIENT
Start: 2022-02-04 | End: 2022-02-03 | Stop reason: HOSPADM

## 2022-02-03 RX ORDER — SODIUM CHLORIDE 9 MG/ML
100 INJECTION, SOLUTION INTRAVENOUS CONTINUOUS
Status: DISCONTINUED | OUTPATIENT
Start: 2022-02-03 | End: 2022-02-03 | Stop reason: HOSPADM

## 2022-02-03 RX ORDER — DEXAMETHASONE SODIUM PHOSPHATE 4 MG/ML
INJECTION, SOLUTION INTRA-ARTICULAR; INTRALESIONAL; INTRAMUSCULAR; INTRAVENOUS; SOFT TISSUE
Status: DISCONTINUED | OUTPATIENT
Start: 2022-02-03 | End: 2022-02-03 | Stop reason: HOSPADM

## 2022-02-03 RX ORDER — PROPOFOL 10 MG/ML
INJECTION, EMULSION INTRAVENOUS
Status: DISCONTINUED | OUTPATIENT
Start: 2022-02-03 | End: 2022-02-03 | Stop reason: HOSPADM

## 2022-02-03 RX ORDER — ASPIRIN 81 MG/1
81 TABLET ORAL EVERY 12 HOURS
Status: DISCONTINUED | OUTPATIENT
Start: 2022-02-03 | End: 2022-02-03 | Stop reason: HOSPADM

## 2022-02-03 RX ORDER — KETOROLAC TROMETHAMINE 30 MG/ML
INJECTION, SOLUTION INTRAMUSCULAR; INTRAVENOUS AS NEEDED
Status: DISCONTINUED | OUTPATIENT
Start: 2022-02-03 | End: 2022-02-03 | Stop reason: HOSPADM

## 2022-02-03 RX ORDER — ACETAMINOPHEN 500 MG
1000 TABLET ORAL EVERY 6 HOURS
Status: DISCONTINUED | OUTPATIENT
Start: 2022-02-03 | End: 2022-02-03 | Stop reason: HOSPADM

## 2022-02-03 RX ORDER — CEFAZOLIN SODIUM/WATER 2 G/20 ML
2 SYRINGE (ML) INTRAVENOUS ONCE
Status: COMPLETED | OUTPATIENT
Start: 2022-02-03 | End: 2022-02-03

## 2022-02-03 RX ORDER — LIDOCAINE HYDROCHLORIDE 10 MG/ML
0.1 INJECTION INFILTRATION; PERINEURAL AS NEEDED
Status: DISCONTINUED | OUTPATIENT
Start: 2022-02-03 | End: 2022-02-03 | Stop reason: HOSPADM

## 2022-02-03 RX ORDER — DEXAMETHASONE SODIUM PHOSPHATE 4 MG/ML
INJECTION, SOLUTION INTRA-ARTICULAR; INTRALESIONAL; INTRAMUSCULAR; INTRAVENOUS; SOFT TISSUE AS NEEDED
Status: DISCONTINUED | OUTPATIENT
Start: 2022-02-03 | End: 2022-02-03 | Stop reason: HOSPADM

## 2022-02-03 RX ADMIN — PHENYLEPHRINE HYDROCHLORIDE 50 MCG: 10 INJECTION INTRAVENOUS at 10:22

## 2022-02-03 RX ADMIN — MEPIVACAINE HYDROCHLORIDE 55 MG: 20 INJECTION, SOLUTION EPIDURAL; INFILTRATION at 09:44

## 2022-02-03 RX ADMIN — Medication 10 MG: at 10:10

## 2022-02-03 RX ADMIN — ACETAMINOPHEN 1000 MG: 500 TABLET, FILM COATED ORAL at 07:48

## 2022-02-03 RX ADMIN — TRANEXAMIC ACID 1 G: 100 INJECTION, SOLUTION INTRAVENOUS at 09:46

## 2022-02-03 RX ADMIN — OXYCODONE HYDROCHLORIDE 5 MG: 5 TABLET ORAL at 12:02

## 2022-02-03 RX ADMIN — Medication 2 G: at 09:35

## 2022-02-03 RX ADMIN — DEXAMETHASONE SODIUM PHOSPHATE 4 MG: 4 INJECTION, SOLUTION INTRA-ARTICULAR; INTRALESIONAL; INTRAMUSCULAR; INTRAVENOUS; SOFT TISSUE at 08:50

## 2022-02-03 RX ADMIN — PROPOFOL 75 MCG/KG/MIN: 10 INJECTION, EMULSION INTRAVENOUS at 09:46

## 2022-02-03 RX ADMIN — DEXAMETHASONE SODIUM PHOSPHATE 8 MG: 4 INJECTION, SOLUTION INTRAMUSCULAR; INTRAVENOUS at 09:51

## 2022-02-03 RX ADMIN — CEFAZOLIN SODIUM 2 G: 10 INJECTION, POWDER, FOR SOLUTION INTRAVENOUS at 16:12

## 2022-02-03 RX ADMIN — MIDAZOLAM 2 MG: 1 INJECTION INTRAMUSCULAR; INTRAVENOUS at 09:17

## 2022-02-03 RX ADMIN — SODIUM CHLORIDE, SODIUM LACTATE, POTASSIUM CHLORIDE, AND CALCIUM CHLORIDE 1000 ML: 600; 310; 30; 20 INJECTION, SOLUTION INTRAVENOUS at 07:56

## 2022-02-03 RX ADMIN — ONDANSETRON 4 MG: 2 INJECTION INTRAMUSCULAR; INTRAVENOUS at 09:51

## 2022-02-03 RX ADMIN — OXYCODONE HYDROCHLORIDE 10 MG: 5 TABLET ORAL at 14:32

## 2022-02-03 RX ADMIN — Medication 3 AMPULE: at 07:48

## 2022-02-03 RX ADMIN — Medication 10 MG: at 10:01

## 2022-02-03 RX ADMIN — SODIUM CHLORIDE, SODIUM LACTATE, POTASSIUM CHLORIDE, AND CALCIUM CHLORIDE: 600; 310; 30; 20 INJECTION, SOLUTION INTRAVENOUS at 11:11

## 2022-02-03 NOTE — ANESTHESIA POSTPROCEDURE EVALUATION
Procedure(s):  RIGHT AMELIA KNEE ARTHROPLASTY TOTAL ROBOTIC ASSISTED/ JOSE M.    spinal    Anesthesia Post Evaluation      Multimodal analgesia: multimodal analgesia used between 6 hours prior to anesthesia start to PACU discharge  Patient location during evaluation: bedside  Patient participation: complete - patient participated  Level of consciousness: awake and responsive to light touch  Pain management: adequate  Airway patency: patent  Anesthetic complications: no  Cardiovascular status: acceptable, hemodynamically stable, blood pressure returned to baseline and stable  Respiratory status: acceptable, unassisted, spontaneous ventilation and nonlabored ventilation  Hydration status: acceptable  Post anesthesia nausea and vomiting:  controlled      INITIAL Post-op Vital signs:   Vitals Value Taken Time   /58 02/03/22 1200   Temp 36.8 °C (98.2 °F) 02/03/22 1109   Pulse 70 02/03/22 1200   Resp 16 02/03/22 1200   SpO2 96 % 02/03/22 1200

## 2022-02-03 NOTE — ANESTHESIA PROCEDURE NOTES
Peripheral Block    Start time: 2/3/2022 8:45 AM  End time: 2/3/2022 8:50 AM  Performed by: Morales Marie MD  Authorized by: Morales Marie MD       Pre-procedure: Indications: at surgeon's request and post-op pain management    Preanesthetic Checklist: patient identified, risks and benefits discussed, site marked, timeout performed, anesthesia consent given and patient being monitored    Timeout Time: 08:45 EST          Block Type:   Block Type:   Adductor canal  Laterality:  Right  Monitoring:  Continuous pulse ox, frequent vital sign checks, heart rate, oxygen and responsive to questions  Injection Technique:  Single shot  Procedures: ultrasound guided    Patient Position: supine  Prep: chlorhexidine    Location:  Mid thigh  Needle Gauge:  20 G  Needle Localization:  Ultrasound guidance  Medication Injected:  Mepivacaine (PF) 2 % (POLOCAINE) injection, 40 mL  dexamethasone (DECADRON) 4 mg/mL injection, 4 mg  Med Admin Time: 2/3/2022 8:50 AM    Assessment:  Number of attempts:  1  Injection Assessment:  Incremental injection every 5 mL, local visualized surrounding nerve on ultrasound, negative aspiration for blood, no intravascular symptoms, no paresthesia and ultrasound image on chart  Patient tolerance:  Patient tolerated the procedure well with no immediate complications

## 2022-02-03 NOTE — H&P
The patient has end stage arthritis of the right knee joint. The patient was seen and examined and there are no changes to the patient's orthopedic condition. They have tried conservative treatment for this condition; including antiinflammatories and lifestyle modifications and have failed. The necessity for the joint replacement is still present, and the H&P from the office is still current. The patient will be admitted today forProcedure(s) (LRB): Will plan for same day discharge. RIGHT AMELIA KNEE ARTHROPLASTY TOTAL ROBOTIC ASSISTED/ JOSE M (Right) .

## 2022-02-03 NOTE — ANESTHESIA PREPROCEDURE EVALUATION
Relevant Problems   No relevant active problems       Anesthetic History   No history of anesthetic complications            Review of Systems / Medical History  Patient summary reviewed and pertinent labs reviewed    Pulmonary  Within defined limits                 Neuro/Psych   Within defined limits           Cardiovascular    Hypertension              Exercise tolerance: >4 METS     GI/Hepatic/Renal     GERD           Endo/Other        Arthritis     Other Findings              Physical Exam    Airway  Mallampati: II  TM Distance: 4 - 6 cm         Cardiovascular    Rhythm: regular  Rate: normal      Pertinent negatives: No murmur   Dental         Pulmonary  Breath sounds clear to auscultation               Abdominal         Other Findings            Anesthetic Plan    ASA: 2  Anesthesia type: spinal      Post-op pain plan if not by surgeon: peripheral nerve block single    Induction: Intravenous  Anesthetic plan and risks discussed with: Patient

## 2022-02-03 NOTE — PROGRESS NOTES
TRANSFER - OUT REPORT:    Verbal report given to Geraldine(name) siddharth Stauffer  being transferred to [facility](unit) for routine progression of care       Report consisted of patients Situation, Background, Assessment and   Recommendations(SBAR). Information from the following report(s) SBAR was reviewed with the receiving nurse. Lines:   Peripheral IV 02/03/22 Posterior;Right Hand (Active)        Opportunity for questions and clarification was provided.       Patient transported with:   O2 @ 0 liters

## 2022-02-03 NOTE — ANESTHESIA PROCEDURE NOTES
Spinal Block    Start time: 2/3/2022 9:39 AM  End time: 2/3/2022 9:44 AM  Performed by: Jn Cardona MD  Authorized by: Jn Cardona MD     Pre-procedure: Indications: primary anesthetic  Preanesthetic Checklist: patient identified, risks and benefits discussed, anesthesia consent, patient being monitored and timeout performed    Timeout Time: 09:39 EST  Preanesthetic Checklist comment:  Risk of nerve damage discussed.     Spinal Block:   Patient Position:  Seated  Prep Region:  Lumbar  Prep: chlorhexidine and patient draped      Location:  L3-4  Technique:  Single shot    Local Dose (mL):  3    Needle:   Needle Type:  Pencan  Needle Gauge:  25 G  Attempts:  1      Events: CSF confirmed, no blood with aspiration and no paresthesia        Assessment:  Insertion:  Uncomplicated  Patient tolerance:  Patient tolerated the procedure well with no immediate complications

## 2022-02-03 NOTE — PROGRESS NOTES
TRANSFER - IN REPORT:    Verbal report received from Leah [facility] on Amberly Benson  being received from PACU for routine post - op      Report consisted of patients Situation, Background, Assessment and   Recommendations(SBAR). Information from the following report(s) SBAR, Kardex, OR Summary, Procedure Summary, Intake/Output, MAR and Recent Results was reviewed with the receiving nurse. Opportunity for questions and clarification was provided. Assessment completed upon patients arrival to unit and care assumed.

## 2022-02-03 NOTE — PROGRESS NOTES
Problem: Mobility Impaired (Adult and Pediatric)  Goal: *Acute Goals and Plan of Care (Insert Text)  Outcome: Progressing Towards Goal  Note: GOALS (1-4 days):  (1.)Ms. Jacobo Robert will move from supine to sit and sit to supine  in bed with INDEPENDENT. (2.)Ms. Jacobo Robert will transfer from bed to chair and chair to bed with SUPERVISION using the least restrictive device. (3.)Ms. Burgess will ambulate with SUPERVISION for 300 feet with the least restrictive device. (4.)Ms. Jacobo Robert will increase right knee ROM to 0°-90°.  ________________________________________________________________________________________________       PHYSICAL THERAPY JOINT CAMP TKA: Initial Assessment and PM 2/3/2022  OUTPATIENT SURGERY: Hospital Day: 1  [place] / [place] / Product Type: Medicare /      NAME/AGE/GENDER: Lianna Perry is a 79 y.o. female   PRIMARY DIAGNOSIS:  Acquired deformity of right knee [M21.961]  Primary osteoarthritis of right knee [M17.11]   Procedure(s) and Anesthesia Type:     * RIGHT AMELIA KNEE ARTHROPLASTY TOTAL ROBOTIC ASSISTED/ JOSE M - Spinal (Right)  ICD-10: Treatment Diagnosis:    Pain in Right Knee (M25.561)  Stiffness of Right Knee, Not elsewhere classified (M25.661)  Difficulty in walking, Not elsewhere classified (R26.2)      ASSESSMENT:     Ms. Jacobo Robert presents s/p R TKA. Patient demonstrates decreased R LE strength and ROM and decreased independence with mobility. She would benefit from therapy to address these deficits prior to d/c home. She lives alone and is independent at baseline but her daughter will be with her for the next few days. Patient did very well with her assessment. SBA for all mobility/transfers. She did well with all exercises. Discussed consistent use of her walker with ambulation, exercises frequency, use of ice, sleeping positions, and positioning to encourage knee extension.   Patient and daughter verbalized understanding and had no additional concerns or questions. She is anxious to d/c home today but will continue therapy until d/c. This section established at most recent assessment   PROBLEM LIST (Impairments causing functional limitations):  Decreased Strength  Decreased ADL/Functional Activities  Decreased Transfer Abilities  Decreased Ambulation Ability/Technique  Decreased Flexibility/Joint Mobility  Edema/Girth  Decreased [place] with Home Exercise Program   INTERVENTIONS PLANNED: (Benefits and precautions of physical therapy have been discussed with the patient.)  Bed Mobility  Cold  Gait Training  Home Exercise Program (HEP)  Range of Motion (ROM)  Therapeutic Activites  Therapeutic Exercise/Strengthening  Transfer Training     TREATMENT PLAN: Frequency/Duration: Follow patient BID for duration of hospital stay to address above goals. Rehabilitation Potential For Stated Goals: Good     RECOMMENDED REHABILITATION/EQUIPMENT: (at time of discharge pending progress): Continue Skilled Therapy. HISTORY:   History of Present Injury/Illness (Reason for Referral):  Pt s/p total knee arthroplasty on 2/3/22  Past Medical History/Comorbidities:   Ms. Ciara Bowie  has a past medical history of Arthritis, Cancer ([facility] [address] (04/2020), GERD (gastroesophageal reflux disease), Hypertension, and Spontaneous pneumothorax. She has no past medical history of Malignant hyperthermia due to anesthesia or Pseudocholinesterase deficiency. Ms. Ciara Bowie  has a past surgical history that includes hx breast lumpectomy (Left, 04/2020); hx orthopaedic (Left); hx colonoscopy; and hx tonsillectomy.   Social History/Living Environment:   Home Environment: Private residence  # Steps to Enter: 0  One/Two Story Residence: One story  Living Alone: Yes  Support Systems: Child(serene)  Patient Expects to be Discharged to[de-identified] Home  Current DME Used/Available at Home: None  Tub or Shower Type: Shower (built in seat)  Prior Level of Function/Work/Activity:  Independent    Number of Personal Factors/Comorbidities that affect the Plan of Care: 0: LOW COMPLEXITY   EXAMINATION:   Most Recent Physical Functioning:      Gross Assessment  AROM: Within functional limits (L LE)  Strength: Within functional limits (L LE)  Coordination: Within functional limits                RLE Strength  R Hip Flexion: 2+  R Knee Flexion: 2+  R Knee Extension: 2+    Bed Mobility  Supine to Sit: Stand-by assistance    Transfers  Sit to Stand: Stand-by assistance  Stand to Sit: Stand-by assistance  Bed to Chair: Stand-by assistance  Toilet Transfers : Stand-by assistance    Balance  Sitting: Intact  Standing: With support              Weight Bearing Status  Right Side Weight Bearing: As tolerated  Distance (ft): 220 Feet (ft)  Ambulation - Level of Assistance: Stand-by assistance  Assistive Device: Walker, rolling  Speed/Nuzhat: Pace decreased (<100 feet/min)  Step Length: Left shortened;Right shortened  Stance: Right decreased  Gait Abnormalities: Antalgic  Interventions: Safety awareness training;Verbal cues     Braces/Orthotics: none           Body Structures Involved:  Joints  Muscles Body Functions Affected: Movement Related Activities and Participation Affected: Mobility  Self Care   Number of elements that affect the Plan of Care: 4+: HIGH COMPLEXITY   CLINICAL PRESENTATION:   Presentation: Stable and uncomplicated: LOW COMPLEXITY   CLINICAL DECISION MAKING:   [facility] 6 Clicks   Basic Mobility Inpatient Short Form  How much difficulty does the patient currently have. .. Unable A Lot A Little None   1. Turning over in bed (including adjusting bedclothes, sheets and blankets)? [] 1   [] 2   [x] 3   [] 4   2. Sitting down on and standing up from a chair with arms ( e.g., wheelchair, bedside commode, etc.)   [] 1   [] 2   [x] 3   [] 4   3. Moving from lying on back to sitting on the side of the bed?    [] 1   [] 2   [x] 3   [] 4   How much help from another person does the patient currently need... Total A Lot A Little None   4. Moving to and from a bed to a chair (including a wheelchair)? [] 1   [] 2   [x] 3   [] 4   5. Need to walk in hospital room? [] 1   [] 2   [x] 3   [] 4   6. Climbing 3-5 steps with a railing? [] 1   [] 2   [x] 3   [] 4   © 2007, Trustees of [address] [place] [address] [address] [address], under license to [employer] All rights reserved     Score:  Initial: 18 Most Recent: X (Date: -- )    Interpretation of Tool:  Represents activities that are increasingly more difficult (i.e. Bed mobility, Transfers, Gait). Medical Necessity:     Patient is expected to demonstrate progress in   strength, range of motion, and functional technique   to   decrease assistance required with functional mobility and TKA managment  . Reason for Services/Other Comments:  Patient continues to require skilled intervention due to   Inability to complete functional mobility and TKA management independently  . Use of outcome tool(s) and clinical judgement create a POC that gives a: Clear prediction of patient's progress: LOW COMPLEXITY            TREATMENT:   (In addition to Assessment/Re-Assessment sessions the following treatments were rendered)     Pre-treatment Symptoms/Complaints:  patient agreeable. Already been to bathroom but needing to go again. Pain Initial:   Pain Intensity 1: 4  Post Session:  4     Therapeutic Activity: (    38 minutes): Therapeutic activities including Bed transfers, Chair transfers, Toilet transfers, Ambulation on level ground, and exercises as below, and d/c education to improve mobility, strength, balance, and coordination. Required minimal Safety awareness training;Verbal cues to promote static and dynamic balance in standing and promote coordination of right, lower extremity(s).      assessment    Date:  2/3/22 Date:   Date:     ACTIVITY/EXERCISE AM PM AM PM AM PM     []  []  []  []  []  []   Ankle Pumps  15       Quad Sets  15       Gluteal Sets  15       Hip ABd/ADduction  15 Straight Leg Raises  15       Knee Slides  15       Short Arc Quads  15       Chair Slides  15                         B = bilateral; AA = active assistive; A = active; P = passive      Treatment/Session Assessment:     Response to Treatment:  Patient participated well and moving well. Education:  [x] Home Exercises  [x] Fall Precautions  [x] Use of Cold Therapy Unit [x] D/C Instruction Review  [] Knee Prosthesis Review  [x] Walker Management/Safety [] Adaptive Equipment as Needed  [x] No pillow under knee       Interdisciplinary Collaboration:   Physical Therapist  Occupational Therapist  Registered Nurse    After treatment position/precautions:   Up in chair  Bed/Chair-wheels locked  Call light within reach  Family at bedside    Compliance with Program/Exercises: Will assess as treatment progresses. Recommendations/Intent for next treatment session:  Treatment next visit will focus on increasing Ms. Burgess's independence with bed mobility, transfers, gait training, strength/ROM exercises, modalities for pain, and patient education.       Total Treatment Duration:  PT Patient Time In/Time Out  Time In: 1410  Time Out: [address] [facility]

## 2022-02-03 NOTE — PERIOP NOTES
Betadine lavage:  17.5cc of betadine lot #[id number]  , exp. Date 05/23  ,  in 500cc of . 9NS Lot #[id number]  , exp.  Date : 11-

## 2022-02-03 NOTE — PROGRESS NOTES
Care Management Interventions  PCP Verified by CM: Yes (Dr. Abraham Pierce)  Transition of Care Consult (CM Consult): Discharge [address] Lebron: Yes  Physical Therapy Consult: Yes  Support Systems: Child(serene) (dtr/Pili)  Confirm Follow Up Transport: Family  The Patient and/or Patient Representative was Provided with a Choice of Provider and Agrees with the Discharge Plan?: Yes  Freedom of Choice List was Provided with Basic Dialogue that Supports the Patient's Individualized Plan of Care/Goals, Treatment Preferences and Shares the Quality Data Associated with the Providers?: Yes  Discharge Location  Patient Expects to be Discharged to[de-identified] Home with home health  Visited with pt to establish prior to admission baseline and discuss their anticipated goals at time of d/c. Pt lives at home, alone, does not have steps and states that her dtr/Pili is staying with her. She needs a BSC and a walker which was provided and is interested in [facility], a referral and order have been placed. CM to follow.

## 2022-02-03 NOTE — OP NOTES
[facility] Robotic Assisted Total Knee Arthroplasty: Posterior Cruciate Retaining       Patient:Tiffany Burgess   : 1940  Medical Record Number:[id number]      Pre-operative Diagnosis:  Acquired deformity of right knee [M21.961]  Primary osteoarthritis of right knee [M17.11]  Post-operative Diagnosis: Acquired deformity of right knee [M21.961]  Primary osteoarthritis of right knee [M17.11]  Location: [place] [address]    Date of Procedure: 2/3/2022  Surgeon: Loretta Alvarado MD  Assistant:  DONITA Dubon    Anesthesia: Spinal and  nerve block    CPT- 11853- Total knee arthroplasty           96180- Other procedures on musculoskeletal system            0055T- Computer assisted surgical navigation     Procedure:  Right Cementless Cruciate Retained Total Knee Arthroplasty with a cemented patella button    Tourniquet Time: none    EBL:  200 cc         The complexity of the total joint surgery requires the use of a first assistant for positioning, retraction and assistance in closure. Andrea Boles was brought to the operating room and positioned on the operating table. She was anesthestized with anesthesia. IV antibiotics was administered. Prior to the incision being made a timeout was called identifying the patient, procedure ,operative side and surgeon The operative leg was prepped and draped in the usual sterile manner. An anterior longitudinal incision was accomplished just medial to the tibial tubercle and extending approximal 6 centimeters proximal to the superior pole of the patella over the right knee. A medial parapatellar capsular incision was performed. The medial capsular flap was elevated around to the insertion of the semimembranous tendon. The patella was everted and the knee flexed and externally rotated. The medial and external menisci were excised. The lateral half of the fat pad excised and the patella femoral ligament was released.   The anterior cruciate ligament was resect and the posterior cruciate ligament was retained. The femoral and tibial arrays were pinned in place and registered with the ULURU 92. The patient landmarks were collected and the tibial and femoral checkpoints were registered and verified. The preresection balancing was performed. The osteophytes were then removed as exposed on the femoral and tibial surfaces. Utilizing the [facility] robotic arm the femoral and tibial cuts were accomplished. The tibia was sized. The tibial base plate was pinned into place with the appropriate external rotation and stem site prepared. A trial femoral component and poly was placed. A preliminary range of motion was accomplished with the trial components. The patient was found to obtain full extension as well as appropriate flexion. The patient's ligaments were stable in flexion and extension to medial and lateral stressing and the alignment was through the appropriate mechanical axis. Additional surgical releases were none. The patella was then everted. Osteophytes were removed. The patella articular surface was inspected and due to wear the patella resurfacing was done. All trial components were removed. The real implants were opened: Sizes selected were a size 2 femoral, 3 tibial, and a 10 mm polyethylene insert. The knee was irrigated. The real components were impacted into place. A 29 mm patella button was cemented into place. [facility] knee was placed through range of motion and noted to be stable as mentioned above with the trial components. The operative knee was injected with 60 cc of Naropin, 10 cc's of morphine and 1 cc of 30 mg of Toradol. The knee was then soaked with Irracept solution for approximately 3 min. This was then thoroughly irrigated. The capsular layer was closed using a #1 PDS suture and a #1 Vicryl.  Then, 1 gram (100 mg/ml) of Transexamic Acid and 1 gram of Vancomycin was injected into the joint space. The subcutaneous layers were closed using 2-0 vicryl. The skin was closed using staples which were applied in occlusive fashion and sterile bandage applied. An Iceman cryo pad was applied on the operative leg. Sponge count and needle counts were correct. Ema Mishra left the operating room     Implants:   Implant Name Type Inv.  Item Serial No.  Lot No. LRB No. Used Action   COMPNT FEM CR TRIATHLN 2 R PA -- MOR-KNEE - [id number]  COMPNT FEM CR TRIATHLN 2 R PA -- MOR-KNEE  [employer] [id number] Right 1 Implanted   BASEPLATE TIB SZ 3 [id number] ML67MM KNEE TRITANIUM 4 CRUCFRM - [id number]  BASEPLATE TIB SZ 3 [id number] ML67MM KNEE TRITANIUM 4 CRUCFRM  [employer]WD [id number] Right 1 Implanted   INSERT TIB CS 3 10 MM ARTC KNEE BEAR [employer]CHN[employer]Y TRIATHLON - [id number]  INSERT TIB CS 3 10 MM ARTC KNEE BEAR TECHNOLOGY TRIATHLON  [employer] [id number] Right 1 Implanted   COMPONENT PAT [id number] THK9MM SUP INFERIOR KNEE CONVENTIONAL - [id number]  COMPONENT PAT [id number] THK9MM SUP INFERIOR KNEE CONVENTIONAL  [employer] [id number] Right [id number] Implanted         Signed By: Iván Smith MD   2/3/2022,  10:54 AM

## 2022-02-03 NOTE — DISCHARGE INSTRUCTIONS
[address] [facility]   Patient Discharge Instructions    Felisa Medeiros / [id number] : 1940    Admitted 2/3/2022 Discharged: 2/3/2022     IF YOU HAVE ANY PROBLEMS ONCE YOU ARE AT HOME CALL THE FOLLOWING NUMBERS:   Main office number: [phone]    Take Home Medications       · It is important that you take the medication exactly as they are prescribed. · Keep your medication in the bottles provided by the pharmacist and keep a list of the medication names, dosages, and times to be taken in your wallet. · Do not take other medications without consulting your doctor. What to do at [address] your prehospital diet. If you have excessive nausea or vomitting call your doctor's office     Home Physical Therapy is arranged. Use rolling walker when walking. Patients who have had a joint replacement should not drive until you are seen for your follow up appointment by Dr. Caryn Gordon. When to Call    - Call if you have a temperature greater then 101  - Unable to keep food down  - Loose control of your bladder or bowel function  - Are unable to bear any weight   - Need a pain medication refill     Patient Education        Total Knee Replacement: What to Expect at  [address] had a total knee replacement. The doctor replaced the worn ends of the bones that connect to your knee (thighbone and lower leg bone) with plastic and metal parts. When you leave the hospital, you should be able to move around with a walker or crutches. But you will need someone to help you at home until you have more energy and can move around better. You will go home with a bandage and stitches, staples, skin glue, or tape strips. Change the bandage as your doctor tells you to. If you have stitches or staples, your doctor will remove them about 2 weeks after your surgery. Glue or tape strips will fall off on their own over time.  You may still have some mild pain, and the area may be swollen for a few months after surgery. Your knee will continue to improve for up to a year. You will probably use a walker for some time after surgery. When you are ready, you can use a cane. You may be able to walk without support after a couple weeks, or when you are comfortable. You will need to do months of physical rehabilitation (rehab) after a knee replacement. Rehab will help you strengthen the muscles of the knee and help you regain movement. After you recover, your artificial knee will allow you to do normal daily activities with less pain or no pain at all. You may be able to hike, dance, or ride a bike. Talk to your doctor about whether you can do more strenuous activities. Always tell your caregivers that you have an artificial knee. How long it will take to walk on your own, return to normal activities, and go back to work depends on your health and how well your rehabilitation (rehab) program goes. The better you do with your rehab exercises, the quicker you will get your strength and movement back. This care sheet gives you a general idea about how long it will take for you to recover. But each person recovers at a different pace. Follow the steps below to get better as quickly as possible. How can you care for yourself at home? Activity    · Rest when you feel tired. You may take a nap, but don't stay in bed all day. When you sit, use a chair with arms. You can use the arms to help you stand up.     · Work with your physical therapist to find the best way to exercise. What you can do as your knee heals will depend on whether your new knee is cemented or uncemented. You may not be able to do certain things for a while if your new knee is uncemented.     · After your knee has healed enough, you can do more strenuous activities with caution. ? You can golf, but you may want to use a golf cart for some time. And don't wear shoes with spikes. ? You can bike on a flat road or on a stationary bike.  Talk to your doctor before biking uphill. ? Your doctor may suggest that you stay away from activities that put stress on your knee. These include tennis, badminton, contact sports like football, jumping (such as in basketball), jogging, and running. ? Avoid activities where you might fall.     · Do not sit for more than 1 hour at a time. Get up and walk around for a while before you sit again. If you must sit for a long time, prop up your leg with a chair or footstool. This will help you avoid swelling.     · Ask your doctor when you can drive again. It may take several weeks after knee replacement surgery before it's safe for you to drive.     · When you get into a car, sit on the edge of the seat. Then pull in your legs, and turn to face the front.     · You should be able to do many everyday activities 3 to 6 weeks after your surgery. You will probably need to take 4 to 16 weeks off from work. When you can go back to work depends on the type of work you do and how you feel.     · Ask your doctor when it is okay for you to have sex.     · For 12 weeks, do not lift anything heavier than 10 pounds and do not lift weights. Diet    · By the time you leave the hospital, you should be eating your normal diet. If your stomach is upset, try bland, low-fat foods like plain rice, broiled chicken, toast, and yogurt. Your doctor may suggest that you take iron and vitamin supplements.     · Drink plenty of fluids (unless your doctor tells you not to).   · Eat healthy foods, and watch your portion sizes. Try to stay at your ideal weight. Too much weight puts more stress on your new knee.     · You may notice that your bowel movements are not regular right after your surgery. This is common. Try to avoid constipation and straining with bowel movements. Drinking enough fluids, taking a stool softener, and eating foods that are good sources of fiber can help you avoid constipation.  If you have not had a bowel movement after a couple of days, talk to your doctor. Medicines    · Your doctor will tell you if and when you can restart your medicines. You will also get instructions about taking any new medicines.     · If you take aspirin or some other blood thinner, ask your doctor if and when to start taking it again. Make sure that you understand exactly what your doctor wants you to do.     · Your doctor may give you a blood-thinning medicine to prevent blood clots. If you take a blood thinner, be sure you get instructions about how to take your medicine safely. Blood thinners can cause serious bleeding problems. This medicine could be in pill form or as a shot (injection). If a shot is needed, your doctor will tell you how to do this.     · Be safe with medicines. Take pain medicines exactly as directed. ? If the doctor gave you a prescription medicine for pain, take it as prescribed. ? If you are not taking a prescription pain medicine, ask your doctor if you can take an over-the-counter medicine. ? Plan to take your pain medicine 30 minutes before exercises. It is easier to prevent pain before it starts than to stop it after it has started.     · If you think your pain medicine is making you sick to your stomach:  ? Take your medicine after meals (unless your doctor has told you not to). ? Ask your doctor for a different pain medicine.     · If your doctor prescribed antibiotics, take them as directed. Do not stop taking them just because you feel better. You need to take the full course of antibiotics. Incision care    · If your doctor told you how to care for your cut (incision), follow your doctor's instructions. You will have a dressing over the cut. A dressing helps the incision heal and protects it. Your doctor will tell you how to take care of this.     · If you did not get instructions, follow this general advice:  ?  If you have strips of tape on the cut the doctor made, leave the tape on for a week or until it falls off.  ? If you have stitches or staples, your doctor will tell you when to come back to have them removed. ? If you have skin glue on the cut, leave it on until it falls off. Skin glue is also called skin adhesive or liquid stitches. ? Change the bandage every day. ? Wash the area daily with warm water, and pat it dry. Don't use hydrogen peroxide or alcohol. They can slow healing. ? You may cover the area with a gauze bandage if it oozes fluid or rubs against clothing. ? You may shower 24 to 48 hours after surgery. Pat the incision dry. Don't swim or take a bath for the first 2 weeks, or until your doctor tells you it is okay. Exercise    · Your rehab program will give you a number of exercises to do to help you get back your knee's range of motion and strength. Always do them as your therapist tells you. Ice    · For pain and swelling, put ice or a cold pack on the area for 10 to 20 minutes at a time. Put a thin cloth between the ice and your skin. Other instructions    · Wear compression stockings if your doctor told you to. These may help to prevent blood clots. Your doctor will tell you how long you need to keep wearing the compression stockings.     · Carry a medical alert card that says you have an artificial joint. You have metal pieces in your knee. These may set off some airport metal detectors. Follow-up care is a key part of your treatment and safety. Be sure to make and go to all appointments, and call your doctor if you are having problems. It's also a good idea to know your test results and keep a list of the medicines you take. When should you call for help? Call 911 anytime you think you may need emergency care. For example, call if:    · You passed out (lost consciousness).     · You have severe trouble breathing.     · You have sudden chest pain and shortness of breath, or you cough up blood.    Call your doctor now or seek immediate medical care if:    · You have signs of infection, such as:  ? Increased pain, swelling, warmth, or redness. ? Red streaks leading from the incision. ? Pus draining from the incision. ? A fever.     · You have signs of a blood clot, such as:  ? Pain in your calf, back of the knee, thigh, or groin. ? Redness and swelling in your leg or groin.     · Your incision comes open and begins to bleed, or the bleeding increases.     · You have pain that does not get better after you take pain medicine. Watch closely for changes in your health, and be sure to contact your doctor if:    · You do not have a bowel movement after taking a laxative. Where can you learn more? Go to http://www.gray.com/  Enter T054 in the search box to learn more about \"Total Knee Replacement: What to Expect at Home. \"  Current as of: July 1, 2021               Content Version: 13.0  © 2006-2021 [employer]. Care instructions adapted under license by [employer] (which disclaims liability or warranty for this information). If you have questions about a medical condition or this instruction, always ask your healthcare professional. [place] [address] any warranty or liability for your use of this information. Information obtained by :  I understand that if any problems occur once I am at home I am to contact my physician. I understand and acknowledge receipt of the instructions indicated above.                                                                                                                                            Physician's or R.N.'s Signature                                                                  Date/Time                                                                                                                                              Patient or Representative Signature                                                          Date/Time

## 2022-02-03 NOTE — PROGRESS NOTES
Received to room from PACU. Right knee Aquacel clean, dry, and intact. Plantar/dorsiflexion moderate, pulses 2+, sensation present in bilateral lower extremities. Ice applied to right knee. Discussed diet and pain control. Oriented to room and bed functions. Bed locked, in lowest position, call light within reach.

## 2022-02-03 NOTE — PROGRESS NOTES
Problem: Self Care Deficits Care Plan (Adult)  Goal: *Acute Goals and Plan of Care (Insert Text)  Outcome: Progressing Towards Goal  Note: GOALS:   DISCHARGE GOALS (in preparation for going home/rehab):  3 days  1. Ms. Enma Wilcox will perform one lower body dressing activity with minimal assistance required to demonstrate improved functional mobility and safety. 2.  Ms. Enma Wilcox will perform one lower body bathing activity with minimal assistance required to demonstrate improved functional mobility and safety. GOAL MET 2/3/2022    3. Ms. Enma Wilcox will perform toileting/toilet transfer with contact guard assistance\/SBA to demonstrate improved functional mobility and safety. GOAL MET 2/3/2022    4. Ms. Enma Wilcox will perform shower transfer with contact guard assistance to demonstrate improved functional mobility and safety. 5.Pt will be able to perform self care with stand by to minimal assistance and ambulate short distances with family that is capable of assisting patient.  GOAL MET 2/3/2022        JOINT CAMP OCCUPATIONAL THERAPY TKA: Initial Assessment and PM 2/3/2022  OUTPATIENT SURGERY: Hospital Day: 1  [place] / [place] / Product Type: Medicare /      NAME/AGE/GENDER: Aaliyah Anne is a 79 y.o. female   PRIMARY DIAGNOSIS:  Acquired deformity of right knee [M21.961]  Primary osteoarthritis of right knee [M17.11]   Procedure(s) and Anesthesia Type:     * RIGHT AMELIA KNEE ARTHROPLASTY TOTAL ROBOTIC ASSISTED/ JOSE M - Spinal (Right)  ICD-10: Treatment Diagnosis:    Pain in Right Knee (M25.561)  Stiffness of Right Knee, Not elsewhere classified (M25.661)  Other lack of cordination (R27.8)  Difficulty in walking, Not elsewhere classified (R26.2)  Other abnormalities of gait and mobility (R26.89)      ASSESSMENT:     Ms. Enma Wilcox is s/p right TKA and presents with decreased weight bearing on right LE and decreased independence with functional mobility and activities of daily living as compared to baseline level of function and safety. Patient would benefit from skilled Occupational Therapy to maximize independence and safety with self-care task and functional mobility. Pt would also benefit from education on adaptive equipment and safety precautions in preparation for going home. She had been up with nursing earlier. She transferred oob and ambulated to the bathroom with SBA. She toileted and got dressed. She washed her hands and ambulated in the hallway with PT.she returned to the room. Her daughters was present and educated both on post op ADl task performance and safety. Patient would like to go home today. Her daughter will be with her. all questions answered. Nursing was present to give pain meds. This section established at most recent assessment   PROBLEM LIST (Impairments causing functional limitations):  Decreased Strength  Decreased ADL/Functional Activities  Decreased Transfer Abilities  Increased Pain  Increased Fatigue  Decreased Flexibility/Joint Mobility  Decreased Knowledge of Precautions   INTERVENTIONS PLANNED: (Benefits and precautions of occupational therapy have been discussed with the patient.)  Activities of daily living training  Adaptive equipment training  Balance training  Clothing management  Donning&doffing training  Theraputic activity     TREATMENT PLAN: Frequency/Duration: Follow patient 1-2 times to address above goals. Rehabilitation Potential For Stated Goals: Good     RECOMMENDED REHABILITATION/EQUIPMENT: (at time of discharge pending progress): Continue Skilled Therapy. OCCUPATIONAL PROFILE AND HISTORY:   History of Present Injury/Illness (Reason for Referral): Pt presents this date s/p (right) TKA. Past Medical History/Comorbidities:   Ms. Abran Irby  has a past medical history of Arthritis, Cancer ([facility] [address] (04/2020), GERD (gastroesophageal reflux disease), Hypertension, and Spontaneous pneumothorax. She has no past medical history of Malignant hyperthermia due to anesthesia or Pseudocholinesterase deficiency. Ms. Betsy Guerrero  has a past surgical history that includes hx breast lumpectomy (Left, 04/2020); hx orthopaedic (Left); hx colonoscopy; and hx tonsillectomy. Social History/Living Environment:   Home Environment: Private residence  # Steps to Enter: 0  One/Two Story Residence: One story  Living Alone: Yes  Support Systems: Child(serene)  Patient Expects to be Discharged to[de-identified] Home  Current DME Used/Available at Home: None  Tub or Shower Type: Shower (built in seat)    Prior Level of Function/Work/Activity:  Mod I     Number of Personal Factors/Comorbidities that affect the Plan of Care: Brief history (0):  LOW COMPLEXITY   ASSESSMENT OF OCCUPATIONAL PERFORMANCE[de-identified]   Most Recent Physical Functioning:   Balance  Sitting: Intact  Standing: With support       Gross Assessment  AROM: Within functional limits (L LE)  Strength: Within functional limits (L LE)  Coordination: Within functional limits            Coordination  Fine Motor Skills-Upper: Left Intact; Right Intact  Gross Motor Skills-Upper: Left Intact; Right Intact         Mental Status  Neurologic State: Alert; Appropriate for age  Orientation Level: Appropriate for age  Cognition: Appropriate decision making; Appropriate for age attention/concentration; Appropriate safety awareness; Follows commands  Perception: Appears intact  Perseveration: No perseveration noted  Safety/Judgement: Awareness of environment; Fall prevention          RLE Strength  R Hip Flexion: 2+  R Knee Flexion: 2+  R Knee Extension: 2+     Basic ADLs (From Assessment) Complex ADLs (From Assessment)   Basic ADL  Feeding: Independent  Oral Facial Hygiene/Grooming: Supervision  Bathing: Minimum assistance  Upper Body Dressing: Supervision  Lower Body Dressing: Minimum assistance  Toileting: Contact guard assistance     Grooming/Bathing/Dressing Activities of Daily Living     Cognitive Retraining  Safety/Judgement: Awareness of environment; Fall prevention                 Functional Transfers  Bathroom Mobility: Stand-by assistance  Toilet Transfer : Stand-by assistance;Contact guard assistance  Shower Transfer: Stand-by assistance;Contact guard assistance     Bed/Mat Mobility  Supine to Sit: Stand-by assistance  Sit to Stand: Stand-by assistance  Stand to Sit: Stand-by assistance  Bed to Chair: Stand-by assistance  Scooting: Stand-by assistance         Physical Skills Involved:  Balance  Strength  Activity Tolerance Cognitive Skills Affected (resulting in the inability to perform in a timely and safe manner):  none Psychosocial Skills Affected:  Habits/Routines  Environmental Adaptation   Number of elements that affect the Plan of Care: 3-5:  MODERATE COMPLEXITY   CLINICAL DECISION MAKIN[address] [place] [address] AM-PAC 6 Clicks   Daily Activity Inpatient Short Form  How much help from another person does the patient currently need. .. Total A Lot A Little None   1. Putting on and taking off regular lower body clothing? [] 1   [] 2   [x] 3   [] 4   2. Bathing (including washing, rinsing, drying)? [] 1   [] 2   [x] 3   [] 4   3. Toileting, which includes using toilet, bedpan or urinal?   [] 1   [] 2   [x] 3   [] 4   4. Putting on and taking off regular upper body clothing? [] 1   [] 2   [] 3   [x] 4   5. Taking care of personal grooming such as brushing teeth? [] 1   [] 2   [] 3   [x] 4   6. Eating meals? [] 1   [] 2   [] 3   [x] 4   © , Trustees of [address] [place] [id number], under license to [employer]. All rights reserved     Score:  Initial: 21 Most Recent: X (Date: -- )    Interpretation of Tool:  Represents activities that are increasingly more difficult (i.e. Bed mobility, Transfers, Gait).      Medical Necessity:     · Patient is expected to demonstrate progress in   · Self care skills and functional mobility  ·     Reason for Services/Other Comments:  · Patient continues to require skilled intervention due to   · Above listed deficits     Use of outcome tool(s) and clinical judgement create a POC that gives a: LOW COMPLEXITY            TREATMENT:   (In addition to Assessment/Re-Assessment sessions the following treatments were rendered)     Pre-treatment Symptoms/Complaints:    Pain: Initial:     0/10 Post Session:  2/10     Self Care: (25): Procedure(s) (per grid) utilized to improve and/or restore self-care/home management as related to dressing, bathing, toileting, grooming, and functional mobility . Required minimal visual, verbal, manual, and tactile cueing to facilitate activities of daily living skills, compensatory activities, and post op ADL task performance and home safety . Assessment complete    Treatment/Session Assessment:     Response to Treatment:  tolerated well wants to go home today. Education:  [] Home Exercises  [x] Fall Precautions  [] Hip Precautions [] Going Home Video  [x] Knee/Hip Prosthesis Review  [x] Walker Management/Safety [x] Adaptive Equipment as Needed       Interdisciplinary Collaboration:   Physical Therapist  Occupational Therapist  Registered Nurse      After treatment position/precautions:   Up in chair  Bed/Chair-wheels locked  Bed in low position  Caregiver at bedside  Call light within reach  RN notified  Family at bedside     Compliance with Program/Exercises: Will assess as treatment progresses. Recommendations/Intent for next treatment session:  Treatment next visit will focus on increasing Ms. Burgess's independence with bed mobility, transfers, self care, functional mobility, modalities for pain, and patient education.       Total Treatment Duration:25  OT Patient Time In/Time Out  Time In: 1410  Time Out: 12[address] [place], OT

## 2022-02-04 ENCOUNTER — HOME CARE VISIT (OUTPATIENT)
Dept: SCHEDULING | Facility: HOME HEALTH | Age: 82
End: 2022-02-04
Payer: MEDICARE

## 2022-02-04 VITALS
TEMPERATURE: 98.4 F | DIASTOLIC BLOOD PRESSURE: 66 MMHG | OXYGEN SATURATION: 98 % | RESPIRATION RATE: 18 BRPM | HEART RATE: 64 BPM | SYSTOLIC BLOOD PRESSURE: 132 MMHG

## 2022-02-04 PROCEDURE — 400018 HH-NO PAY CLAIM PROCEDURE

## 2022-02-04 PROCEDURE — 400013 HH SOC

## 2022-02-04 PROCEDURE — 3331090001 HH PPS REVENUE CREDIT

## 2022-02-04 PROCEDURE — G0151 HHCP-SERV OF PT,EA 15 MIN: HCPCS

## 2022-02-04 PROCEDURE — 3331090002 HH PPS REVENUE DEBIT

## 2022-02-05 PROCEDURE — 3331090002 HH PPS REVENUE DEBIT

## 2022-02-05 PROCEDURE — 3331090001 HH PPS REVENUE CREDIT

## 2022-02-06 PROCEDURE — 3331090002 HH PPS REVENUE DEBIT

## 2022-02-06 PROCEDURE — 3331090001 HH PPS REVENUE CREDIT

## 2022-02-07 PROCEDURE — 3331090002 HH PPS REVENUE DEBIT

## 2022-02-07 PROCEDURE — 3331090001 HH PPS REVENUE CREDIT

## 2022-02-08 ENCOUNTER — HOME CARE VISIT (OUTPATIENT)
Dept: SCHEDULING | Facility: HOME HEALTH | Age: 82
End: 2022-02-08
Payer: MEDICARE

## 2022-02-08 VITALS
DIASTOLIC BLOOD PRESSURE: 68 MMHG | OXYGEN SATURATION: 98 % | SYSTOLIC BLOOD PRESSURE: 118 MMHG | RESPIRATION RATE: 18 BRPM | TEMPERATURE: 97.8 F | HEART RATE: 78 BPM

## 2022-02-08 PROCEDURE — 3331090002 HH PPS REVENUE DEBIT

## 2022-02-08 PROCEDURE — G0151 HHCP-SERV OF PT,EA 15 MIN: HCPCS

## 2022-02-08 PROCEDURE — 3331090001 HH PPS REVENUE CREDIT

## 2022-02-09 PROCEDURE — 3331090001 HH PPS REVENUE CREDIT

## 2022-02-09 PROCEDURE — 3331090002 HH PPS REVENUE DEBIT

## 2022-02-10 ENCOUNTER — HOME CARE VISIT (OUTPATIENT)
Dept: SCHEDULING | Facility: HOME HEALTH | Age: 82
End: 2022-02-10
Payer: MEDICARE

## 2022-02-10 VITALS — TEMPERATURE: 97.8 F | OXYGEN SATURATION: 98 % | HEART RATE: 78 BPM | RESPIRATION RATE: 18 BRPM

## 2022-02-10 PROCEDURE — 3331090002 HH PPS REVENUE DEBIT

## 2022-02-10 PROCEDURE — G0151 HHCP-SERV OF PT,EA 15 MIN: HCPCS

## 2022-02-10 PROCEDURE — 3331090001 HH PPS REVENUE CREDIT

## 2022-02-11 PROCEDURE — 3331090001 HH PPS REVENUE CREDIT

## 2022-02-11 PROCEDURE — 3331090002 HH PPS REVENUE DEBIT

## 2022-02-12 PROCEDURE — 3331090001 HH PPS REVENUE CREDIT

## 2022-02-12 PROCEDURE — 3331090002 HH PPS REVENUE DEBIT

## 2022-02-13 PROCEDURE — 3331090001 HH PPS REVENUE CREDIT

## 2022-02-13 PROCEDURE — 3331090002 HH PPS REVENUE DEBIT

## 2022-02-14 ENCOUNTER — HOME CARE VISIT (OUTPATIENT)
Dept: HOME HEALTH SERVICES | Facility: HOME HEALTH | Age: 82
End: 2022-02-14
Payer: MEDICARE

## 2022-02-14 ENCOUNTER — HOME CARE VISIT (OUTPATIENT)
Dept: SCHEDULING | Facility: HOME HEALTH | Age: 82
End: 2022-02-14
Payer: MEDICARE

## 2022-02-14 VITALS
OXYGEN SATURATION: 99 % | RESPIRATION RATE: 17 BRPM | TEMPERATURE: 97.6 F | DIASTOLIC BLOOD PRESSURE: 64 MMHG | SYSTOLIC BLOOD PRESSURE: 120 MMHG | HEART RATE: 68 BPM

## 2022-02-14 PROCEDURE — 3331090001 HH PPS REVENUE CREDIT

## 2022-02-14 PROCEDURE — 3331090002 HH PPS REVENUE DEBIT

## 2022-02-14 PROCEDURE — G0157 HHC PT ASSISTANT EA 15: HCPCS

## 2022-02-15 PROCEDURE — 3331090001 HH PPS REVENUE CREDIT

## 2022-02-15 PROCEDURE — 3331090002 HH PPS REVENUE DEBIT

## 2022-02-16 PROCEDURE — 3331090001 HH PPS REVENUE CREDIT

## 2022-02-16 PROCEDURE — 3331090002 HH PPS REVENUE DEBIT

## 2022-02-17 ENCOUNTER — HOME CARE VISIT (OUTPATIENT)
Dept: HOME HEALTH SERVICES | Facility: HOME HEALTH | Age: 82
End: 2022-02-17
Payer: MEDICARE

## 2022-02-17 ENCOUNTER — HOME CARE VISIT (OUTPATIENT)
Dept: SCHEDULING | Facility: HOME HEALTH | Age: 82
End: 2022-02-17
Payer: MEDICARE

## 2022-02-17 VITALS
TEMPERATURE: 98 F | RESPIRATION RATE: 17 BRPM | SYSTOLIC BLOOD PRESSURE: 128 MMHG | HEART RATE: 68 BPM | DIASTOLIC BLOOD PRESSURE: 68 MMHG | OXYGEN SATURATION: 98 %

## 2022-02-17 PROCEDURE — G0157 HHC PT ASSISTANT EA 15: HCPCS

## 2022-02-17 PROCEDURE — 3331090001 HH PPS REVENUE CREDIT

## 2022-02-17 PROCEDURE — 3331090002 HH PPS REVENUE DEBIT

## 2022-02-18 PROCEDURE — 3331090001 HH PPS REVENUE CREDIT

## 2022-02-18 PROCEDURE — 3331090002 HH PPS REVENUE DEBIT

## 2022-02-19 PROCEDURE — 3331090002 HH PPS REVENUE DEBIT

## 2022-02-19 PROCEDURE — 3331090001 HH PPS REVENUE CREDIT

## 2022-02-20 PROCEDURE — 3331090001 HH PPS REVENUE CREDIT

## 2022-02-20 PROCEDURE — 3331090002 HH PPS REVENUE DEBIT

## 2022-02-21 ENCOUNTER — HOME CARE VISIT (OUTPATIENT)
Dept: SCHEDULING | Facility: HOME HEALTH | Age: 82
End: 2022-02-21
Payer: MEDICARE

## 2022-02-21 VITALS
OXYGEN SATURATION: 98 % | HEART RATE: 78 BPM | SYSTOLIC BLOOD PRESSURE: 120 MMHG | RESPIRATION RATE: 18 BRPM | TEMPERATURE: 97.8 F | DIASTOLIC BLOOD PRESSURE: 70 MMHG

## 2022-02-21 PROCEDURE — 3331090001 HH PPS REVENUE CREDIT

## 2022-02-21 PROCEDURE — 3331090002 HH PPS REVENUE DEBIT

## 2022-02-21 PROCEDURE — G0151 HHCP-SERV OF PT,EA 15 MIN: HCPCS

## 2022-07-29 ENCOUNTER — OFFICE VISIT (OUTPATIENT)
Dept: ORTHOPEDIC SURGERY | Age: 82
End: 2022-07-29

## 2022-07-29 DIAGNOSIS — Z96.651 STATUS POST RIGHT KNEE REPLACEMENT: Primary | ICD-10-CM

## 2022-07-29 PROCEDURE — 99024 POSTOP FOLLOW-UP VISIT: CPT | Performed by: ORTHOPAEDIC SURGERY

## 2022-07-29 NOTE — PROGRESS NOTES
Name: Cherylene Edin  YOB: 1940  Gender: female  MRN: [id number]      Current Outpatient Medications:     amLODIPine-benazepril (LOTREL) 5-40 MG per capsule, Take 1 capsule by mouth daily, Disp: , Rfl:     Calcium Carbonate-Vitamin D (CALCIUM-VITAMIN D) 600-125 MG-UNIT TABS, Take 2 tablets by mouth 2 times daily, Disp: , Rfl:     omeprazole (PRILOSEC) 20 MG delayed release capsule, Take 20 mg by mouth daily, Disp: , Rfl:     tamoxifen (NOLVADEX) 20 MG tablet, Take 20 mg by mouth daily, Disp: , Rfl:   Not on File    CC: 6 months post-op right TKA    HPI: The patient is here now 6 months post right total knee arthroplasty. They report that they are functioning well and denies knee pain, swelling, or instability. They report occasional soreness. No other new complaints. PMH: Reviewed and unchanged    ROS:  As per HPI; pertinent positives and negatives are addressed with the patient, particularly, those related to musculoskeletal concerns. Non-orthopaedic concerns were referred back to the primary care physician. PE:  right Knee  Patient is comfortable and in no distress. ROM: 0 to 125 degrees  AP translation: 4 mm  M-L laxity: 2 degrees  Alignment: 4 degrees of valgus  Quadriceps strength: excellent  Gait: no limp  Incision: well healed    Radiographs: AP/Lateral and sunrise of the right knee taken in the office today reveal a good bone, prosthetic appearance. The patella is balanced. Radiographic Diagnosis:  Stable total knee arthroplasty. Impression: Status post total knee arthroplasty    Recommendations:  Reviewed x-ray findings with the patient. Activities to be advanced as tolerated. Normal lifetime restrictions as discussed. Appropriate activities for strengthening and conditioning were reviewed. Return appointment in 6 months for follow up and x-rays.     Approximately 20 minutes was spent reviewing the medical record, imaging, performing history and physical

## 2022-09-09 ENCOUNTER — OFFICE VISIT (OUTPATIENT)
Dept: ORTHOPEDIC SURGERY | Age: 82
End: 2022-09-09
Payer: MEDICARE

## 2022-09-09 DIAGNOSIS — M75.41 IMPINGEMENT SYNDROME OF RIGHT SHOULDER: ICD-10-CM

## 2022-09-09 DIAGNOSIS — M25.511 RIGHT SHOULDER PAIN, UNSPECIFIED CHRONICITY: Primary | ICD-10-CM

## 2022-09-09 PROCEDURE — 1090F PRES/ABSN URINE INCON ASSESS: CPT | Performed by: ORTHOPAEDIC SURGERY

## 2022-09-09 PROCEDURE — 1036F TOBACCO NON-USER: CPT | Performed by: ORTHOPAEDIC SURGERY

## 2022-09-09 PROCEDURE — 20610 DRAIN/INJ JOINT/BURSA W/O US: CPT | Performed by: ORTHOPAEDIC SURGERY

## 2022-09-09 PROCEDURE — 99204 OFFICE O/P NEW MOD 45 MIN: CPT | Performed by: ORTHOPAEDIC SURGERY

## 2022-09-09 PROCEDURE — G8420 CALC BMI NORM PARAMETERS: HCPCS | Performed by: ORTHOPAEDIC SURGERY

## 2022-09-09 PROCEDURE — G8427 DOCREV CUR MEDS BY ELIG CLIN: HCPCS | Performed by: ORTHOPAEDIC SURGERY

## 2022-09-09 PROCEDURE — G8400 PT W/DXA NO RESULTS DOC: HCPCS | Performed by: ORTHOPAEDIC SURGERY

## 2022-09-09 PROCEDURE — 1123F ACP DISCUSS/DSCN MKR DOCD: CPT | Performed by: ORTHOPAEDIC SURGERY

## 2022-09-09 RX ORDER — TRIAMCINOLONE ACETONIDE 40 MG/ML
80 INJECTION, SUSPENSION INTRA-ARTICULAR; INTRAMUSCULAR ONCE
Status: COMPLETED | OUTPATIENT
Start: 2022-09-09 | End: 2022-09-09

## 2022-09-09 RX ADMIN — TRIAMCINOLONE ACETONIDE 80 MG: 40 INJECTION, SUSPENSION INTRA-ARTICULAR; INTRAMUSCULAR at 15:12

## 2022-09-10 PROBLEM — R30.0 DYSURIA: Status: ACTIVE | Noted: 2017-09-27

## 2022-09-10 PROBLEM — N95.2 VAGINAL ATROPHY: Status: ACTIVE | Noted: 2017-11-10

## 2022-09-10 PROBLEM — R35.1 NOCTURIA: Status: ACTIVE | Noted: 2018-09-28

## 2022-09-10 PROBLEM — K83.8 COMMON BILE DUCT DILATATION: Status: ACTIVE | Noted: 2019-09-27

## 2022-09-10 PROBLEM — G47.00 INSOMNIA: Status: ACTIVE | Noted: 2022-09-10

## 2022-09-10 PROBLEM — J30.2 SEASONAL ALLERGIES: Status: ACTIVE | Noted: 2022-09-10

## 2022-09-10 PROBLEM — K21.9 GERD (GASTROESOPHAGEAL REFLUX DISEASE): Status: ACTIVE | Noted: 2022-09-10

## 2022-09-10 PROBLEM — K86.89 MASS OF PANCREAS: Status: ACTIVE | Noted: 2019-10-18

## 2022-09-10 PROBLEM — N28.1 BILATERAL RENAL CYSTS: Status: ACTIVE | Noted: 2017-12-21

## 2022-09-10 PROBLEM — N39.0 FREQUENT UTI: Status: ACTIVE | Noted: 2017-09-01

## 2022-09-10 PROBLEM — I10 HYPERTENSION: Status: ACTIVE | Noted: 2022-09-10

## 2022-09-10 PROBLEM — R39.9 UTI SYMPTOMS: Status: ACTIVE | Noted: 2018-05-24

## 2022-09-11 NOTE — PROGRESS NOTES
Name: Tae Segal  YOB: 1940  Gender: female  MRN: [id number]    CC:   Chief Complaint   Patient presents with    Shoulder Pain     Right shoulder pain    Right shoulder pain    HPI:  This patient presents with a one month history of Right shoulder pain. Patient notes she had a fall which hurt her shoulder. She notes it has improved since the fall, but there are certain motions such as abduction and driving that will aggravate the shoulder. She notes pain located on the lateral aspect of the shoulder. Patient denies popping, clicking, numbness and tingling. Patient does note some interference with sleep. Patient has tried tylenol and aleve without relief. No Known Allergies  Past Medical History:   Diagnosis Date    Arthritis     Cancer ([facility] [address] 04/2020    left  lumpectomy    GERD (gastroesophageal reflux disease)     Hypertension     Spontaneous pneumothorax     x 2           \"in my twenties\"     Past Surgical History:   Procedure Laterality Date    BREAST LUMPECTOMY Left 04/2020    COLONOSCOPY      ORTHOPEDIC SURGERY Left     ruptured tendon repair    TONSILLECTOMY      as a child     History reviewed. No pertinent family history. Social History     Socioeconomic History    Marital status:       Spouse name: Not on file    Number of children: Not on file    Years of education: Not on file    Highest education level: Not on file   Occupational History    Not on file   Tobacco Use    Smoking status: Former    Smokeless tobacco: Never    Tobacco comments:     Quit smoking: smoked in college   Substance and Sexual Activity    Alcohol use: Yes    Drug use: Never    Sexual activity: Not on file   Other Topics Concern    Not on file   Social History Narrative    Not on file     Social Determinants of Health     Financial Resource Strain: Not on file   Food Insecurity: Not on file   Transportation Needs: Not on file   Physical Activity: Not on file   Stress: Not on file   Social Connections: Not on file   Intimate Partner Violence: Not on file   Housing Stability: Not on file        No flowsheet data found. Review of Systems  Noncontributory   Pertinent positives and negatives are addressed with the patient, particularly those related to musculoskeletal concerns. Non-orthopaedic concerns were referred back to the primary care physician. PE:    GEN: General appearance is that of a healthy patient, alert and oriented, in no distress. WDWN. HEENT:  Normocephalic, atraumatic. Extraocular muscles are intact. Neck:  Supple. Heart:  Regular pulse exam on all extremities. Good color and warmth noted. Lungs:  Normal non-labored breathing with no obvious shortness of breath. Abdomen:  WNL's. Skin:  No signs of rash or bruising. Pysch: Answers questions appropriately, AO X 3. MSK:    Cervical spine: No tenderness. Normal active motion. Negative Spurling's maneuver. SHOULDER   Right (Involved) left   Skin Intact Intact   Radial Pulses 2+ Symmetrical 2+ Symmetrical   Deformity None Normal   Myotomes Normal Normal   Dermatomes  Normal Normal    ROM Appropriate with pain overhead Full   Strength 4+/5 abduciton, 4/5 external rotation, 5/5 internal rotaiton No weakness   Atrophy None noted None noted   Effusion/Swelling  None noted None noted   Palpation Minimally TTP at the shoulder No tenderness   Bicep Tendon Rupture  Negative Negative signs   Bear Hug, Belly Press Negative, negative Negative   Crossed Arm Adduction Test normal    Instability/Ant. Apprehension Test None noted None noted   Impingement Positive  shilpa Shoemaker, SUKHJINDER Negative      X-rays:   Grashey, AP  views of the Right shoulder that were obtained 8/18/22 and reviewed today in the office, show a type 2 acromion. The humeral head is not high riding . No evidence of fracture, arthritis, dislocation or loose body. Severe degenerative changes of the A-C joint.      Impression: Right shoulder severe AC DJD    Assessment:     ICD-10-CM    1. Right shoulder pain, unspecified chronicity  M25.511 Ambulatory referral to Physical Therapy     triamcinolone acetonide (KENALOG-40) injection 80 mg     DRAIN/INJECT LARGE JOINT/BURSA      2. Impingement syndrome of right shoulder  M75.41 Ambulatory referral to Physical Therapy     triamcinolone acetonide (KENALOG-40) injection 80 mg     DRAIN/INJECT LARGE JOINT/BURSA           Plan: I had a long discussion with the patient regarding the natural history of the problem, as well as treatment options. Discussed with the patient the possibility of rotator cuff pathology, but given her age she is at increased likelihood for surgical failure. We decided to proceed conservatively with PT and CSI for treatment of impingement syndrome. She will FU in 6 weeks for recheck. Treatment:     Recommend therapy to evaluate and treat current complaints and pathology. A home exercise program was also discussed with the patient. Procedure note: After discussion of risks and benefits including, but not limited to pain, infection, steroid flare, increased blood sugar, fat necrosis, skin discoloration, and injury to blood vessels or nerves, the patient verbally consented to proceed with a subacromial injection. They understand that we are proceeding with this in a way to avoid proceeding with more definitive major surgical options. The affected right shoulder was sterilely prepped in standard fashion and injected with 2 cc of Kenalog (40mg/ml), 2 cc of 1% Lidocaine, and 2 cc of 0.5% Marcaine into the subacromial space. The patient tolerated the injection well. No follow-ups on file. Thank you for the opportunity to see your patient. If you have any questions please give me a call.   Sincerely,    Bean Muhammad  09/11/22

## 2023-01-30 ENCOUNTER — OFFICE VISIT (OUTPATIENT)
Dept: ORTHOPEDIC SURGERY | Age: 83
End: 2023-01-30
Payer: MEDICARE

## 2023-01-30 DIAGNOSIS — Z96.651 STATUS POST RIGHT KNEE REPLACEMENT: Primary | ICD-10-CM

## 2023-01-30 PROCEDURE — 1123F ACP DISCUSS/DSCN MKR DOCD: CPT | Performed by: ORTHOPAEDIC SURGERY

## 2023-01-30 PROCEDURE — G8427 DOCREV CUR MEDS BY ELIG CLIN: HCPCS | Performed by: ORTHOPAEDIC SURGERY

## 2023-01-30 PROCEDURE — G8400 PT W/DXA NO RESULTS DOC: HCPCS | Performed by: ORTHOPAEDIC SURGERY

## 2023-01-30 PROCEDURE — 1036F TOBACCO NON-USER: CPT | Performed by: ORTHOPAEDIC SURGERY

## 2023-01-30 PROCEDURE — 1090F PRES/ABSN URINE INCON ASSESS: CPT | Performed by: ORTHOPAEDIC SURGERY

## 2023-01-30 PROCEDURE — G8420 CALC BMI NORM PARAMETERS: HCPCS | Performed by: ORTHOPAEDIC SURGERY

## 2023-01-30 PROCEDURE — G8484 FLU IMMUNIZE NO ADMIN: HCPCS | Performed by: ORTHOPAEDIC SURGERY

## 2023-01-30 PROCEDURE — 99213 OFFICE O/P EST LOW 20 MIN: CPT | Performed by: ORTHOPAEDIC SURGERY

## 2023-01-30 NOTE — PROGRESS NOTES
Name: Julianna Allison  YOB: 1940  Gender: female  MRN: [id number]      Current Outpatient Medications:     amLODIPine-benazepril (LOTREL) 5-40 MG per capsule, Take 1 capsule by mouth daily, Disp: , Rfl:     Calcium Carbonate-Vitamin D (CALCIUM-VITAMIN D) 600-125 MG-UNIT TABS, Take 2 tablets by mouth 2 times daily, Disp: , Rfl:     omeprazole (PRILOSEC) 20 MG delayed release capsule, Take 20 mg by mouth daily, Disp: , Rfl:     tamoxifen (NOLVADEX) 20 MG tablet, Take 20 mg by mouth daily, Disp: , Rfl:   No Known Allergies    CC: Post-op right TKA    HPI: The patient is here now post total knee arthroplasty. They report that they are functioning well and denies knee pain, swelling, or instability. They report occasional soreness. No other new complaints. PMH: Reviewed and unchanged    ROS:  As per HPI; pertinent positives and negatives are addressed with the patient, particularly, those related to musculoskeletal concerns. Non-orthopaedic concerns were referred back to the primary care physician. PE:  right Knee  Patient is comfortable and in no distress. ROM: 0 to 125 degrees  AP translation: 4 mm  M-L laxity: 2 degrees  Alignment: 4 degrees of valgus  Quadriceps strength: excellent  Gait: no limp  Incision: well healed    Radiographs: AP/Lateral and sunrise of the right knee taken in the office today reveal a good bone, prosthetic appearance. The patella is balanced. Radiographic Diagnosis:  Stable total knee arthroplasty. Impression: Status post total knee arthroplasty    Recommendations:  Reviewed x-ray findings with the patient. Activities to be advanced as tolerated. Normal lifetime restrictions as discussed. Appropriate activities for strengthening and conditioning were reviewed. Return appointment in 2 years for follow up and x-rays.     Approximately 20 minutes was spent reviewing the medical record, imaging, performing history and physical examination, discussing the diagnosis and treatment plan with the patient, and completing documentation for this visit. We spent a fair amount of time today discussing what would be reasonable expectation. Dysesthesia knee occasional trouble descending stairs may persist.  She was informed her knee is quite stable with good range of motion and no suggestion of any effusion or compromise.   The left knee is little bit uncomfortable but I would like her left knee to become more substantial with issue before considering left knee arthroplasty as I am not sure I can duplicate a much better result than the right side that she has currently

## 2023-05-16 ENCOUNTER — TELEPHONE (OUTPATIENT)
Dept: ORTHOPEDIC SURGERY | Age: 83
End: 2023-05-16

## 2023-05-16 NOTE — TELEPHONE ENCOUNTER
Will you please  work this pt in sometime in the next  few  weeks at  either  location   It is her non surgical  left  knee  causing her lots  of pain  and  discomfort

## 2023-05-31 ENCOUNTER — TELEPHONE (OUTPATIENT)
Dept: ORTHOPEDIC SURGERY | Age: 83
End: 2023-05-31

## 2023-05-31 ENCOUNTER — OFFICE VISIT (OUTPATIENT)
Dept: ORTHOPEDIC SURGERY | Age: 83
End: 2023-05-31
Payer: MEDICARE

## 2023-05-31 DIAGNOSIS — G89.29 CHRONIC PAIN OF LEFT KNEE: Primary | ICD-10-CM

## 2023-05-31 DIAGNOSIS — M25.562 CHRONIC PAIN OF LEFT KNEE: Primary | ICD-10-CM

## 2023-05-31 DIAGNOSIS — M17.12 OSTEOARTHRITIS OF LEFT KNEE, UNSPECIFIED OSTEOARTHRITIS TYPE: ICD-10-CM

## 2023-05-31 PROCEDURE — G8428 CUR MEDS NOT DOCUMENT: HCPCS | Performed by: ORTHOPAEDIC SURGERY

## 2023-05-31 PROCEDURE — 1036F TOBACCO NON-USER: CPT | Performed by: ORTHOPAEDIC SURGERY

## 2023-05-31 PROCEDURE — 99214 OFFICE O/P EST MOD 30 MIN: CPT | Performed by: ORTHOPAEDIC SURGERY

## 2023-05-31 PROCEDURE — 1123F ACP DISCUSS/DSCN MKR DOCD: CPT | Performed by: ORTHOPAEDIC SURGERY

## 2023-05-31 PROCEDURE — 1090F PRES/ABSN URINE INCON ASSESS: CPT | Performed by: ORTHOPAEDIC SURGERY

## 2023-05-31 PROCEDURE — G8421 BMI NOT CALCULATED: HCPCS | Performed by: ORTHOPAEDIC SURGERY

## 2023-05-31 PROCEDURE — 20611 DRAIN/INJ JOINT/BURSA W/US: CPT | Performed by: ORTHOPAEDIC SURGERY

## 2023-05-31 PROCEDURE — G8400 PT W/DXA NO RESULTS DOC: HCPCS | Performed by: ORTHOPAEDIC SURGERY

## 2023-05-31 RX ORDER — TRIAMCINOLONE ACETONIDE 40 MG/ML
40 INJECTION, SUSPENSION INTRA-ARTICULAR; INTRAMUSCULAR ONCE
Status: COMPLETED | OUTPATIENT
Start: 2023-05-31 | End: 2023-05-31

## 2023-05-31 RX ADMIN — TRIAMCINOLONE ACETONIDE 40 MG: 40 INJECTION, SUSPENSION INTRA-ARTICULAR; INTRAMUSCULAR at 11:03

## 2023-05-31 NOTE — PROGRESS NOTES
use: Yes    Drug use: Never    Sexual activity: Not on file   Other Topics Concern    Not on file   Social History Narrative    Not on file     Social Determinants of Health     Financial Resource Strain: Not on file   Food Insecurity: Not on file   Transportation Needs: Not on file   Physical Activity: Not on file   Stress: Not on file   Social Connections: Not on file   Intimate Partner Violence: Not on file   Housing Stability: Not on file       Review of Systems:  As per HPI. Pertinent positives and negatives are addressed with the patient, particularly those related to musculoskeletal concerns. Non-orthopaedic concerns were referred back to the primary care physician. PHYSICAL EXAMINATION:   The patient appears their stated age and they are in no distress. The lower extremities are as described below. Circulation is normal with palpable pedal pulses bilaterally and no edema. There is no lymph adenopathy in the popliteal or malleolar region. The skin is without stasis disease distally bilaterally. Hip ROM is smooth and painless. Knee range of motion is 0-120 degrees on the right and 5-120 degrees on the left. left knee: There is 2mm of anterior/posterior translation and 2mm of medial/lateral instability bilaterally. There is 12 degrees of valgus alignment in the left knee. There is some pain to palpation over the medial joint line. Limb lengths are equal.  The gait is noted to be with a slight trendelenburg and antalgia. Straight leg test is negative. Quadriceps strength is good. Sensation is intact to light touch bilaterally. Their judgment and insight are normal.  They are oriented to time, place and person. Their memory is good and the mood and affect appropriate. X-RAY: Views of the left knee are reviewed. 4 views standing reveal joint space loss, eburnated bone, and osteophyte formation present.   X-ray impression:  Advanced degenerative joint disease of left knee    IMPRESSION:

## 2023-08-14 DIAGNOSIS — M17.12 OSTEOARTHRITIS OF LEFT KNEE, UNSPECIFIED OSTEOARTHRITIS TYPE: Primary | ICD-10-CM

## 2023-08-21 DIAGNOSIS — M17.12 OSTEOARTHRITIS OF LEFT KNEE, UNSPECIFIED OSTEOARTHRITIS TYPE: ICD-10-CM

## 2023-08-23 ENCOUNTER — TELEPHONE (OUTPATIENT)
Dept: ORTHOPEDIC SURGERY | Age: 83
End: 2023-08-23

## 2023-08-23 NOTE — TELEPHONE ENCOUNTER
Call patient, is confused on if should come into office and go to hospital also tomorrow for pre op?

## 2023-08-24 ENCOUNTER — PREP FOR PROCEDURE (OUTPATIENT)
Dept: ORTHOPEDIC SURGERY | Age: 83
End: 2023-08-24

## 2023-08-24 ENCOUNTER — HOSPITAL ENCOUNTER (OUTPATIENT)
Dept: SURGERY | Age: 83
Discharge: HOME | End: 2023-08-24
Payer: MEDICARE

## 2023-08-24 VITALS
HEIGHT: 65 IN | TEMPERATURE: 98.2 F | BODY MASS INDEX: 23.03 KG/M2 | OXYGEN SATURATION: 97 % | SYSTOLIC BLOOD PRESSURE: 160 MMHG | WEIGHT: 138.23 LBS | DIASTOLIC BLOOD PRESSURE: 67 MMHG | HEART RATE: 77 BPM | RESPIRATION RATE: 16 BRPM

## 2023-08-24 DIAGNOSIS — Z01.818 PREOP EXAMINATION: Primary | ICD-10-CM

## 2023-08-24 DIAGNOSIS — Z01.818 PREOP EXAMINATION: ICD-10-CM

## 2023-08-24 LAB
ANION GAP SERPL CALC-SCNC: 6 MMOL/L (ref 2–11)
APTT PPP: 28.1 SEC (ref 24.5–34.2)
BASOPHILS # BLD: 0.1 K/UL (ref 0–0.2)
BASOPHILS NFR BLD: 1 % (ref 0–2)
BUN SERPL-MCNC: 22 MG/DL (ref 8–23)
CALCIUM SERPL-MCNC: 9.1 MG/DL (ref 8.3–10.4)
CHLORIDE SERPL-SCNC: 108 MMOL/L (ref 101–110)
CO2 SERPL-SCNC: 29 MMOL/L (ref 21–32)
CREAT SERPL-MCNC: 0.73 MG/DL (ref 0.6–1)
DIFFERENTIAL METHOD BLD: ABNORMAL
EKG ATRIAL RATE: 75 BPM
EKG DIAGNOSIS: NORMAL
EKG P AXIS: 50 DEGREES
EKG P-R INTERVAL: 116 MS
EKG Q-T INTERVAL: 388 MS
EKG QRS DURATION: 98 MS
EKG QTC CALCULATION (BAZETT): 433 MS
EKG R AXIS: 64 DEGREES
EKG T AXIS: 61 DEGREES
EKG VENTRICULAR RATE: 75 BPM
EOSINOPHIL # BLD: 0.1 K/UL (ref 0–0.8)
EOSINOPHIL NFR BLD: 1 % (ref 0.5–7.8)
ERYTHROCYTE [DISTWIDTH] IN BLOOD BY AUTOMATED COUNT: 12.6 % (ref 11.9–14.6)
EST. AVERAGE GLUCOSE BLD GHB EST-MCNC: 105 MG/DL
GLUCOSE SERPL-MCNC: 96 MG/DL (ref 65–100)
HBA1C MFR BLD: 5.3 % (ref 4.8–5.6)
HCT VFR BLD AUTO: 37.8 % (ref 35.8–46.3)
HGB BLD-MCNC: 12.3 G/DL (ref 11.7–15.4)
IMM GRANULOCYTES # BLD AUTO: 0 K/UL (ref 0–0.5)
IMM GRANULOCYTES NFR BLD AUTO: 0 % (ref 0–5)
INR PPP: 1
LYMPHOCYTES # BLD: 1.6 K/UL (ref 0.5–4.6)
LYMPHOCYTES NFR BLD: 31 % (ref 13–44)
MCH RBC QN AUTO: 31.6 PG (ref 26.1–32.9)
MCHC RBC AUTO-ENTMCNC: 32.5 G/DL (ref 31.4–35)
MCV RBC AUTO: 97.2 FL (ref 82–102)
MONOCYTES # BLD: 0.3 K/UL (ref 0.1–1.3)
MONOCYTES NFR BLD: 5 % (ref 4–12)
NEUTS SEG # BLD: 3.2 K/UL (ref 1.7–8.2)
NEUTS SEG NFR BLD: 61 % (ref 43–78)
NRBC # BLD: 0 K/UL (ref 0–0.2)
PLATELET # BLD AUTO: 237 K/UL (ref 150–450)
PMV BLD AUTO: 9.5 FL (ref 9.4–12.3)
POTASSIUM SERPL-SCNC: 3.9 MMOL/L (ref 3.5–5.1)
PROTHROMBIN TIME: 13.2 SEC (ref 12.6–14.3)
RBC # BLD AUTO: 3.89 M/UL (ref 4.05–5.2)
SODIUM SERPL-SCNC: 143 MMOL/L (ref 133–143)
WBC # BLD AUTO: 5.1 K/UL (ref 4.3–11.1)

## 2023-08-24 PROCEDURE — 85610 PROTHROMBIN TIME: CPT

## 2023-08-24 PROCEDURE — 93005 ELECTROCARDIOGRAM TRACING: CPT | Performed by: ANESTHESIOLOGY

## 2023-08-24 PROCEDURE — 80048 BASIC METABOLIC PNL TOTAL CA: CPT

## 2023-08-24 PROCEDURE — 93010 ELECTROCARDIOGRAM REPORT: CPT | Performed by: INTERNAL MEDICINE

## 2023-08-24 PROCEDURE — 83036 HEMOGLOBIN GLYCOSYLATED A1C: CPT

## 2023-08-24 PROCEDURE — 85025 COMPLETE CBC W/AUTO DIFF WBC: CPT

## 2023-08-24 PROCEDURE — 87641 MR-STAPH DNA AMP PROBE: CPT

## 2023-08-24 PROCEDURE — 85730 THROMBOPLASTIN TIME PARTIAL: CPT

## 2023-08-24 RX ORDER — SODIUM CHLORIDE 9 MG/ML
INJECTION, SOLUTION INTRAVENOUS PRN
Status: CANCELLED | OUTPATIENT
Start: 2023-08-24

## 2023-08-24 RX ORDER — SODIUM CHLORIDE 0.9 % (FLUSH) 0.9 %
5-40 SYRINGE (ML) INJECTION EVERY 12 HOURS SCHEDULED
Status: CANCELLED | OUTPATIENT
Start: 2023-08-24

## 2023-08-24 RX ORDER — SODIUM CHLORIDE 0.9 % (FLUSH) 0.9 %
5-40 SYRINGE (ML) INJECTION PRN
Status: CANCELLED | OUTPATIENT
Start: 2023-08-24

## 2023-08-24 ASSESSMENT — PAIN SCALES - GENERAL: PAINLEVEL_OUTOF10: 0

## 2023-08-24 NOTE — PERIOP NOTE
The below lab results are within anesthesia guidelines, no follow-up required. Labs automatically routed to ordering provider via Epic documentation.      Latest Reference Range & Units 08/24/23 12:31   Sodium 133 - 143 mmol/L 143   Potassium 3.5 - 5.1 mmol/L 3.9   Chloride 101 - 110 mmol/L 108   CO2 21 - 32 mmol/L 29   BUN,BUNPL 8 - 23 MG/DL 22   Creatinine 0.6 - 1.0 MG/DL 0.73   Anion Gap 2 - 11 mmol/L 6   Est, Glom Filt Rate >60 ml/min/1.73m2 >60   Glucose, Random 65 - 100 mg/dL 96   CALCIUM, SERUM, 661666 8.3 - 10.4 MG/DL 9.1   Hemoglobin A1C 4.8 - 5.6 % 5.3   eAG (mg/dL) mg/dL 105   WBC 4.3 - 11.1 K/uL 5.1   RBC 4.05 - 5.2 M/uL 3.89 (L)   Hemoglobin Quant 11.7 - 15.4 g/dL 12.3   Hematocrit 35.8 - 46.3 % 37.8   MCV 82.0 - 102.0 FL 97.2   MCH 26.1 - 32.9 PG 31.6   MCHC 31.4 - 35.0 g/dL 32.5   MPV 9.4 - 12.3 FL 9.5   RDW 11.9 - 14.6 % 12.6   Platelet Count 449 - 450 K/uL 237   Neutrophils % 43 - 78 % 61   Lymphocyte % 13 - 44 % 31   Monocytes % 4.0 - 12.0 % 5   Eosinophils % 0.5 - 7.8 % 1   Basophils % 0.0 - 2.0 % 1   Neutrophils Absolute 1.7 - 8.2 K/UL 3.2   Lymphocytes Absolute 0.5 - 4.6 K/UL 1.6   Monocytes Absolute 0.1 - 1.3 K/UL 0.3   Eosinophils Absolute 0.0 - 0.8 K/UL 0.1   Basophils Absolute 0.0 - 0.2 K/UL 0.1   Differential Type -   AUTOMATED   Immature Granulocytes 0.0 - 5.0 % 0   Nucleated Red Blood Cells 0.0 - 0.2 K/uL 0.00   Absolute Immature Granulocyte 0.0 - 0.5 K/UL 0.0   Prothrombin Time 12.6 - 14.3 sec 13.2   INR -   1.0   PTT 24.5 - 34.2 SEC 28.1   MRSA/STAPH AUREUS DNA  Rpt   (L): Data is abnormally low  Rpt: View report in Results Review for more information

## 2023-08-24 NOTE — PERIOP NOTE
PLEASE CONTINUE TAKING ALL PRESCRIPTION MEDICATIONS UP TO THE DAY OF SURGERY UNLESS OTHERWISE DIRECTED BELOW. DISCONTINUE all vitamins, herbals and supplements starting today. DISCONTINUE Non-Steriodal Anti-Inflammatory (NSAIDS) such as Advil, Ibuprofen, Motrin, Naproxen and Aleve starting today. *IT IS OK TO TAKE TYLENOL*                                     Home Medications to HOLD                                     (In addition to the above)                            Home medications to TAKE the morning of surgery    Omeprazole            Comments   The day before surgery please take Tylenol (Acetaminophen) 1000mg in the morning and 1000mg before bed. You may substitute for Tylenol 650 mg.         Bring Dynahex wash and Incentive Spirometer with you to hospital on the day of surgery. Please do not bring home medications with you on the day of surgery unless otherwise directed by your nurse. If you are instructed to bring home medications, please give them to your nurse as they will be administered by the nursing staff. If you have any questions, please call [facility] [phone]. A copy of this note was provided to the patient for reference.

## 2023-08-24 NOTE — PERIOP NOTE
Patient verified name and . Order for consent found in EHR and matches case posting; patient verified. Type 3 surgery, joint assessment complete. Labs per surgeon: CBC,BMP, PT/PTT, HgA1c; results pending. Labs per anesthesia protocol: no additional  EKG: Completed today; results within anesthesia limits. MRSA/MSSA swab collected; pharmacy to review and dose antibiotic as appropriate. Hospital approved surgical skin cleanser and instructions to return bottle on DOS given per hospital policy. Patient provided with handouts including Guide to Surgery, Pain Management, Hand Hygiene, Blood Transfusion Education, and [place] [facility] Brochure. Patient answered medical/surgical history questions at their best of ability. All prior to admission medications documented in [facility]. Original medication prescription bottle NOT visualized during patient appointment. Patient instructed to hold all vitamins, supplements, herbals 3 weeks prior to surgery and NSAIDS 5 days prior to surgery. *Patient instructed to oncologist to hold Tamoxifen \"a couple\" of days prior to sx. Pt instructed to follow oncologist's instructions. Patient teach back successful and patient demonstrates knowledge of instruction.

## 2023-08-25 LAB
MRSA DNA SPEC QL NAA+PROBE: NOT DETECTED
S AUREUS CPE NOSE QL NAA+PROBE: NOT DETECTED

## 2023-08-25 NOTE — PROGRESS NOTES
Total Joint Surgery Preoperative Chart Review      Patient ID:  Peri Martin  [id number]  83 y.o.  1940  Surgeon: Dr. Ivette Li  Date of Surgery: 9/7/2023  Procedure: Total Left Knee Arthroplasty  Primary Care Physician: Don Art [phone]  Specialty Physician(s):      Subjective:   Peri Martin is a 79 y.o. White (non-) female who presents for preoperative evaluation for Total Left Knee arthroplasty. This is a preoperative chart review note based on data collected by the nurse at the surgical Pre-Assessment visit. Past Medical History:   Diagnosis Date    Arthritis     Cancer ([address]) 04/2020    left  lumpectomy    GERD (gastroesophageal reflux disease)     managed with medication    Hypertension     managed with medication    IPMN (intraductal papillary mucinous neoplasm)     Malignant neoplasm of upper-outer quadrant of left breast in female, estrogen receptor positive ([address])     Followed by Oncology    Pancreatic cyst     Followed by Dr. Rosalba Roberts    Spontaneous pneumothorax     x 2           \"in my twenties\"      Past Surgical History:   Procedure Laterality Date    BREAST LUMPECTOMY Left 04/2020    CATARACT EXTRACTION Bilateral 2016    COLONOSCOPY      ORTHOPEDIC SURGERY Left     ruptured tendon repair on foot    THORACOTOMY      20years old, pneumothorax    TONSILLECTOMY      as a child    TOTAL KNEE ARTHROPLASTY Right 02/2022     No family history on file. Social History     Tobacco Use    Smoking status: Former    Smokeless tobacco: Never    Tobacco comments:     Quit smoking: smoked in college   Substance Use Topics    Alcohol use: Yes       Prior to Admission medications    Medication Sig Start Date End Date Taking?  Authorizing Provider   amLODIPine-benazepril (LOTREL) 5-40 MG per capsule Take 1 capsule by mouth daily    Ar Automatic Reconciliation   omeprazole (PRILOSEC) 20 MG delayed release capsule Take 1 capsule by mouth daily 8/27/21   Ar Automatic Reconciliation   tamoxifen

## 2023-09-06 ENCOUNTER — OFFICE VISIT (OUTPATIENT)
Dept: ORTHOPEDIC SURGERY | Age: 83
End: 2023-09-06

## 2023-09-06 DIAGNOSIS — M17.12 OSTEOARTHRITIS OF LEFT KNEE, UNSPECIFIED OSTEOARTHRITIS TYPE: Primary | ICD-10-CM

## 2023-09-06 RX ORDER — OXYCODONE HYDROCHLORIDE 5 MG/1
5-10 TABLET ORAL EVERY 6 HOURS PRN
Qty: 60 TABLET | Refills: 0 | Status: SHIPPED | OUTPATIENT
Start: 2023-09-06 | End: 2023-09-11

## 2023-09-06 RX ORDER — ONDANSETRON 4 MG/1
4 TABLET, FILM COATED ORAL EVERY 8 HOURS PRN
Qty: 20 TABLET | Refills: 0 | Status: SHIPPED | OUTPATIENT
Start: 2023-09-06

## 2023-09-06 RX ORDER — ASPIRIN 81 MG/1
81 TABLET ORAL 2 TIMES DAILY
Qty: 70 TABLET | Refills: 0 | Status: SHIPPED | OUTPATIENT
Start: 2023-09-06 | End: 2023-10-11

## 2023-09-06 NOTE — H&P
[facility]  History and Physical Exam    Patient ID:  Debi Sqeueira  [id number]    69 y.o.  1940    Today: September 6, 2023    Vitals Signs: Reviewed as noted in medical record. Allergies: No Known Allergies    CC: Left knee pain    HPI:  Pt complains of left knee pain and difficulty ambulating. Relevant PMH:   Past Medical History:   Diagnosis Date    Arthritis     Cancer ([address]) 04/2020    left  lumpectomy    GERD (gastroesophageal reflux disease)     managed with medication    Hypertension     managed with medication    IPMN (intraductal papillary mucinous neoplasm)     Malignant neoplasm of upper-outer quadrant of left breast in female, estrogen receptor positive ([address])     Followed by Oncology    Pancreatic cyst     Followed by Dr. Rody Breaux    Spontaneous pneumothorax     x 2           \"in my twenties\"       Objective:                    HEENT: NC/AT                   Lungs:  clear                   Heart:   rrr                   Abdomen: soft                   Extremities:  Pain with rom of the left knee joint    Radiographs: reveal left knee osteoarthritis with loss of joint space and bone spurs. Assessment: Degenerative arthritis of left knee [M17.12]    Plan:  Proceed with scheduled Procedure(s) (LRB):  KNEE TOTAL ARTHROPLASTY ROBOTIC SDD (Left) . The patient has failed conservative treatment including NSAIDS, and injections. Total joint replacement surgery and associated risks and benefits have been discussed with the patient along with implant selection including but not limited to Karma and DePuy total joint systems. Due to the amount of pain the patient is experiencing we will proceed with the scheduled procedure. We will plan for same-day discharge.       Signed By: Vidhi Morrison [place]  September 6, 2023

## 2023-09-06 NOTE — PROGRESS NOTES
Name: Alicia Sal  YOB: 1940  Gender: female  MRN: [id number]      Current Outpatient Medications:     amLODIPine-benazepril (LOTREL) 5-40 MG per capsule, Take 1 capsule by mouth daily, Disp: , Rfl:     omeprazole (PRILOSEC) 20 MG delayed release capsule, Take 1 capsule by mouth daily, Disp: , Rfl:     tamoxifen (NOLVADEX) 20 MG tablet, Take 1 tablet by mouth daily Patient instructed by oncologist to hold medication \"a couple\" days prior to surgery, Disp: , Rfl:   No Known Allergies    Pre-op  exam Left TKA  CC:  left knee pain    History:  Patient returns today for pre-op exam prior to Left TKA. Postop scripts have been E scribed to patient's pharmacy. See formal H&P in Epic chart.

## 2023-09-07 ENCOUNTER — ANESTHESIA EVENT (OUTPATIENT)
Dept: SURGERY | Age: 83
End: 2023-09-07
Payer: MEDICARE

## 2023-09-08 ENCOUNTER — ANESTHESIA (OUTPATIENT)
Dept: SURGERY | Age: 83
End: 2023-09-08
Payer: MEDICARE

## 2023-09-08 ENCOUNTER — HOSPITAL ENCOUNTER (OUTPATIENT)
Age: 83
Setting detail: OUTPATIENT SURGERY
Discharge: HOME HEALTH | End: 2023-09-08
Attending: ORTHOPAEDIC SURGERY | Admitting: ORTHOPAEDIC SURGERY
Payer: MEDICARE

## 2023-09-08 ENCOUNTER — HOME HEALTH ADMISSION (OUTPATIENT)
Dept: HOME HEALTH SERVICES | Facility: HOME HEALTH | Age: 83
End: 2023-09-08
Payer: MEDICARE

## 2023-09-08 VITALS
RESPIRATION RATE: 18 BRPM | OXYGEN SATURATION: 97 % | TEMPERATURE: 98.1 F | HEIGHT: 65 IN | WEIGHT: 138.5 LBS | HEART RATE: 60 BPM | SYSTOLIC BLOOD PRESSURE: 153 MMHG | BODY MASS INDEX: 23.07 KG/M2 | DIASTOLIC BLOOD PRESSURE: 66 MMHG

## 2023-09-08 PROCEDURE — 6360000002 HC RX W HCPCS: Performed by: ANESTHESIOLOGY

## 2023-09-08 PROCEDURE — 6370000000 HC RX 637 (ALT 250 FOR IP): Performed by: ANESTHESIOLOGY

## 2023-09-08 PROCEDURE — 64447 NJX AA&/STRD FEMORAL NRV IMG: CPT | Performed by: ANESTHESIOLOGY

## 2023-09-08 PROCEDURE — 2709999900 HC NON-CHARGEABLE SUPPLY: Performed by: ORTHOPAEDIC SURGERY

## 2023-09-08 PROCEDURE — 2580000003 HC RX 258: Performed by: ANESTHESIOLOGY

## 2023-09-08 PROCEDURE — 2500000003 HC RX 250 WO HCPCS: Performed by: NURSE ANESTHETIST, CERTIFIED REGISTERED

## 2023-09-08 PROCEDURE — 2500000003 HC RX 250 WO HCPCS: Performed by: ORTHOPAEDIC SURGERY

## 2023-09-08 PROCEDURE — 3600000005 HC SURGERY LEVEL 5 BASE: Performed by: ORTHOPAEDIC SURGERY

## 2023-09-08 PROCEDURE — 97161 PT EVAL LOW COMPLEX 20 MIN: CPT

## 2023-09-08 PROCEDURE — 7100000000 HC PACU RECOVERY - FIRST 15 MIN: Performed by: ORTHOPAEDIC SURGERY

## 2023-09-08 PROCEDURE — 2720000010 HC SURG SUPPLY STERILE: Performed by: ORTHOPAEDIC SURGERY

## 2023-09-08 PROCEDURE — C1776 JOINT DEVICE (IMPLANTABLE): HCPCS | Performed by: ORTHOPAEDIC SURGERY

## 2023-09-08 PROCEDURE — 2580000003 HC RX 258: Performed by: NURSE ANESTHETIST, CERTIFIED REGISTERED

## 2023-09-08 PROCEDURE — 2580000003 HC RX 258: Performed by: ORTHOPAEDIC SURGERY

## 2023-09-08 PROCEDURE — 97535 SELF CARE MNGMENT TRAINING: CPT

## 2023-09-08 PROCEDURE — 97530 THERAPEUTIC ACTIVITIES: CPT

## 2023-09-08 PROCEDURE — 6360000002 HC RX W HCPCS: Performed by: NURSE ANESTHETIST, CERTIFIED REGISTERED

## 2023-09-08 PROCEDURE — 2500000003 HC RX 250 WO HCPCS: Performed by: ANESTHESIOLOGY

## 2023-09-08 PROCEDURE — 3600000015 HC SURGERY LEVEL 5 ADDTL 15MIN: Performed by: ORTHOPAEDIC SURGERY

## 2023-09-08 PROCEDURE — 6370000000 HC RX 637 (ALT 250 FOR IP): Performed by: PHYSICIAN ASSISTANT

## 2023-09-08 PROCEDURE — C1713 ANCHOR/SCREW BN/BN,TIS/BN: HCPCS | Performed by: ORTHOPAEDIC SURGERY

## 2023-09-08 PROCEDURE — 3700000001 HC ADD 15 MINUTES (ANESTHESIA): Performed by: ORTHOPAEDIC SURGERY

## 2023-09-08 PROCEDURE — 97165 OT EVAL LOW COMPLEX 30 MIN: CPT

## 2023-09-08 PROCEDURE — 7100000001 HC PACU RECOVERY - ADDTL 15 MIN: Performed by: ORTHOPAEDIC SURGERY

## 2023-09-08 PROCEDURE — 6360000002 HC RX W HCPCS: Performed by: ORTHOPAEDIC SURGERY

## 2023-09-08 PROCEDURE — 3700000000 HC ANESTHESIA ATTENDED CARE: Performed by: ORTHOPAEDIC SURGERY

## 2023-09-08 PROCEDURE — 6360000002 HC RX W HCPCS: Performed by: PHYSICIAN ASSISTANT

## 2023-09-08 DEVICE — INSERT TIB CS 3 10 MM ARTC POST KNEE BEAR TECHNOLOGY X3: Type: IMPLANTABLE DEVICE | Site: KNEE | Status: FUNCTIONAL

## 2023-09-08 DEVICE — DEPUY CMW 2 FAST SET BONE CEMENT 20G: Type: IMPLANTABLE DEVICE | Site: KNEE | Status: FUNCTIONAL

## 2023-09-08 DEVICE — CRUCIATE RETAINING FEMORAL
Type: IMPLANTABLE DEVICE | Site: KNEE | Status: FUNCTIONAL
Brand: TRIATHLON

## 2023-09-08 DEVICE — ASYMMETRIC PATELLA
Type: IMPLANTABLE DEVICE | Site: KNEE | Status: FUNCTIONAL
Brand: TRIATHLON

## 2023-09-08 DEVICE — TIBIAL COMPONENT
Type: IMPLANTABLE DEVICE | Site: KNEE | Status: FUNCTIONAL
Brand: TRIATHLON

## 2023-09-08 DEVICE — COMPONENT TOT KNEE CAPPED PRIMARY K2STRYKER] STRYKER CORP]: Type: IMPLANTABLE DEVICE | Status: FUNCTIONAL

## 2023-09-08 RX ORDER — SODIUM CHLORIDE, SODIUM LACTATE, POTASSIUM CHLORIDE, CALCIUM CHLORIDE 600; 310; 30; 20 MG/100ML; MG/100ML; MG/100ML; MG/100ML
INJECTION, SOLUTION INTRAVENOUS CONTINUOUS
Status: DISCONTINUED | OUTPATIENT
Start: 2023-09-08 | End: 2023-09-08 | Stop reason: HOSPADM

## 2023-09-08 RX ORDER — SODIUM CHLORIDE 0.9 % (FLUSH) 0.9 %
5-40 SYRINGE (ML) INJECTION EVERY 12 HOURS SCHEDULED
Status: DISCONTINUED | OUTPATIENT
Start: 2023-09-08 | End: 2023-09-08 | Stop reason: HOSPADM

## 2023-09-08 RX ORDER — SODIUM CHLORIDE 0.9 % (FLUSH) 0.9 %
5-40 SYRINGE (ML) INJECTION PRN
Status: DISCONTINUED | OUTPATIENT
Start: 2023-09-08 | End: 2023-09-08 | Stop reason: SDUPTHER

## 2023-09-08 RX ORDER — SODIUM CHLORIDE 9 MG/ML
INJECTION, SOLUTION INTRAVENOUS CONTINUOUS
Status: DISCONTINUED | OUTPATIENT
Start: 2023-09-08 | End: 2023-09-08 | Stop reason: HOSPADM

## 2023-09-08 RX ORDER — LISINOPRIL 20 MG/1
40 TABLET ORAL DAILY
Status: DISCONTINUED | OUTPATIENT
Start: 2023-09-09 | End: 2023-09-08 | Stop reason: HOSPADM

## 2023-09-08 RX ORDER — TRANEXAMIC ACID 100 MG/ML
INJECTION, SOLUTION INTRAVENOUS PRN
Status: DISCONTINUED | OUTPATIENT
Start: 2023-09-08 | End: 2023-09-08 | Stop reason: SDUPTHER

## 2023-09-08 RX ORDER — KETOROLAC TROMETHAMINE 30 MG/ML
INJECTION, SOLUTION INTRAMUSCULAR; INTRAVENOUS PRN
Status: DISCONTINUED | OUTPATIENT
Start: 2023-09-08 | End: 2023-09-08 | Stop reason: ALTCHOICE

## 2023-09-08 RX ORDER — MIDAZOLAM HYDROCHLORIDE 2 MG/2ML
2 INJECTION, SOLUTION INTRAMUSCULAR; INTRAVENOUS
Status: COMPLETED | OUTPATIENT
Start: 2023-09-08 | End: 2023-09-08

## 2023-09-08 RX ORDER — SODIUM CHLORIDE 9 MG/ML
INJECTION, SOLUTION INTRAVENOUS PRN
Status: DISCONTINUED | OUTPATIENT
Start: 2023-09-08 | End: 2023-09-08 | Stop reason: SDUPTHER

## 2023-09-08 RX ORDER — PROPOFOL 10 MG/ML
INJECTION, EMULSION INTRAVENOUS CONTINUOUS PRN
Status: DISCONTINUED | OUTPATIENT
Start: 2023-09-08 | End: 2023-09-08 | Stop reason: SDUPTHER

## 2023-09-08 RX ORDER — PROCHLORPERAZINE EDISYLATE 5 MG/ML
5 INJECTION INTRAMUSCULAR; INTRAVENOUS
Status: DISCONTINUED | OUTPATIENT
Start: 2023-09-08 | End: 2023-09-08 | Stop reason: HOSPADM

## 2023-09-08 RX ORDER — ROPIVACAINE HYDROCHLORIDE 2 MG/ML
INJECTION, SOLUTION EPIDURAL; INFILTRATION; PERINEURAL
Status: COMPLETED | OUTPATIENT
Start: 2023-09-08 | End: 2023-09-08

## 2023-09-08 RX ORDER — SENNA AND DOCUSATE SODIUM 50; 8.6 MG/1; MG/1
1 TABLET, FILM COATED ORAL 2 TIMES DAILY
Status: DISCONTINUED | OUTPATIENT
Start: 2023-09-08 | End: 2023-09-08 | Stop reason: HOSPADM

## 2023-09-08 RX ORDER — LIDOCAINE HYDROCHLORIDE 10 MG/ML
1 INJECTION, SOLUTION INFILTRATION; PERINEURAL
Status: COMPLETED | OUTPATIENT
Start: 2023-09-08 | End: 2023-09-08

## 2023-09-08 RX ORDER — POLYETHYLENE GLYCOL 3350 17 G/17G
17 POWDER, FOR SOLUTION ORAL DAILY PRN
Status: DISCONTINUED | OUTPATIENT
Start: 2023-09-08 | End: 2023-09-08 | Stop reason: HOSPADM

## 2023-09-08 RX ORDER — PROPOFOL 10 MG/ML
INJECTION, EMULSION INTRAVENOUS PRN
Status: DISCONTINUED | OUTPATIENT
Start: 2023-09-08 | End: 2023-09-08 | Stop reason: SDUPTHER

## 2023-09-08 RX ORDER — ONDANSETRON 2 MG/ML
4 INJECTION INTRAMUSCULAR; INTRAVENOUS EVERY 6 HOURS PRN
Status: DISCONTINUED | OUTPATIENT
Start: 2023-09-08 | End: 2023-09-08 | Stop reason: HOSPADM

## 2023-09-08 RX ORDER — LIDOCAINE HYDROCHLORIDE 20 MG/ML
INJECTION, SOLUTION EPIDURAL; INFILTRATION; INTRACAUDAL; PERINEURAL PRN
Status: DISCONTINUED | OUTPATIENT
Start: 2023-09-08 | End: 2023-09-08 | Stop reason: SDUPTHER

## 2023-09-08 RX ORDER — SODIUM CHLORIDE 9 MG/ML
INJECTION, SOLUTION INTRAVENOUS PRN
Status: DISCONTINUED | OUTPATIENT
Start: 2023-09-08 | End: 2023-09-08 | Stop reason: HOSPADM

## 2023-09-08 RX ORDER — AMLODIPINE BESYLATE 5 MG/1
5 TABLET ORAL DAILY
Status: DISCONTINUED | OUTPATIENT
Start: 2023-09-09 | End: 2023-09-08 | Stop reason: HOSPADM

## 2023-09-08 RX ORDER — ASPIRIN 81 MG/1
81 TABLET ORAL 2 TIMES DAILY
Status: DISCONTINUED | OUTPATIENT
Start: 2023-09-08 | End: 2023-09-08 | Stop reason: HOSPADM

## 2023-09-08 RX ORDER — ACETAMINOPHEN 325 MG/1
650 TABLET ORAL EVERY 6 HOURS
Status: DISCONTINUED | OUTPATIENT
Start: 2023-09-08 | End: 2023-09-08 | Stop reason: HOSPADM

## 2023-09-08 RX ORDER — GLYCOPYRROLATE 0.2 MG/ML
INJECTION INTRAMUSCULAR; INTRAVENOUS PRN
Status: DISCONTINUED | OUTPATIENT
Start: 2023-09-08 | End: 2023-09-08 | Stop reason: SDUPTHER

## 2023-09-08 RX ORDER — AMLODIPINE AND BENAZEPRIL HYDROCHLORIDE 5; 40 MG/1; MG/1
1 CAPSULE ORAL DAILY
Status: DISCONTINUED | OUTPATIENT
Start: 2023-09-08 | End: 2023-09-08 | Stop reason: ALTCHOICE

## 2023-09-08 RX ORDER — ACETAMINOPHEN 500 MG
1000 TABLET ORAL ONCE
Status: COMPLETED | OUTPATIENT
Start: 2023-09-08 | End: 2023-09-08

## 2023-09-08 RX ORDER — ROPIVACAINE HYDROCHLORIDE 2 MG/ML
INJECTION, SOLUTION EPIDURAL; INFILTRATION; PERINEURAL PRN
Status: DISCONTINUED | OUTPATIENT
Start: 2023-09-08 | End: 2023-09-08 | Stop reason: ALTCHOICE

## 2023-09-08 RX ORDER — VANCOMYCIN HYDROCHLORIDE 1 G/20ML
INJECTION, POWDER, LYOPHILIZED, FOR SOLUTION INTRAVENOUS PRN
Status: DISCONTINUED | OUTPATIENT
Start: 2023-09-08 | End: 2023-09-08 | Stop reason: ALTCHOICE

## 2023-09-08 RX ORDER — DIPHENHYDRAMINE HYDROCHLORIDE 50 MG/ML
12.5 INJECTION INTRAMUSCULAR; INTRAVENOUS
Status: DISCONTINUED | OUTPATIENT
Start: 2023-09-08 | End: 2023-09-08 | Stop reason: HOSPADM

## 2023-09-08 RX ORDER — SODIUM CHLORIDE 0.9 % (FLUSH) 0.9 %
5-40 SYRINGE (ML) INJECTION PRN
Status: DISCONTINUED | OUTPATIENT
Start: 2023-09-08 | End: 2023-09-08 | Stop reason: HOSPADM

## 2023-09-08 RX ORDER — OXYCODONE HYDROCHLORIDE 5 MG/1
5 TABLET ORAL
Status: COMPLETED | OUTPATIENT
Start: 2023-09-08 | End: 2023-09-08

## 2023-09-08 RX ORDER — ONDANSETRON 4 MG/1
4 TABLET, ORALLY DISINTEGRATING ORAL EVERY 8 HOURS PRN
Status: DISCONTINUED | OUTPATIENT
Start: 2023-09-08 | End: 2023-09-08 | Stop reason: HOSPADM

## 2023-09-08 RX ORDER — SODIUM CHLORIDE 0.9 % (FLUSH) 0.9 %
5-40 SYRINGE (ML) INJECTION EVERY 12 HOURS SCHEDULED
Status: DISCONTINUED | OUTPATIENT
Start: 2023-09-08 | End: 2023-09-08 | Stop reason: SDUPTHER

## 2023-09-08 RX ORDER — DEXAMETHASONE SODIUM PHOSPHATE 4 MG/ML
INJECTION, SOLUTION INTRA-ARTICULAR; INTRALESIONAL; INTRAMUSCULAR; INTRAVENOUS; SOFT TISSUE
Status: COMPLETED | OUTPATIENT
Start: 2023-09-08 | End: 2023-09-08

## 2023-09-08 RX ORDER — SODIUM CHLORIDE, SODIUM LACTATE, POTASSIUM CHLORIDE, CALCIUM CHLORIDE 600; 310; 30; 20 MG/100ML; MG/100ML; MG/100ML; MG/100ML
INJECTION, SOLUTION INTRAVENOUS CONTINUOUS PRN
Status: DISCONTINUED | OUTPATIENT
Start: 2023-09-08 | End: 2023-09-08 | Stop reason: SDUPTHER

## 2023-09-08 RX ORDER — PANTOPRAZOLE SODIUM 40 MG/1
40 TABLET, DELAYED RELEASE ORAL
Status: DISCONTINUED | OUTPATIENT
Start: 2023-09-09 | End: 2023-09-08 | Stop reason: HOSPADM

## 2023-09-08 RX ORDER — DEXTROSE MONOHYDRATE 100 MG/ML
INJECTION, SOLUTION INTRAVENOUS CONTINUOUS PRN
Status: DISCONTINUED | OUTPATIENT
Start: 2023-09-08 | End: 2023-09-08 | Stop reason: HOSPADM

## 2023-09-08 RX ORDER — OXYCODONE HYDROCHLORIDE 5 MG/1
10 TABLET ORAL EVERY 4 HOURS PRN
Status: DISCONTINUED | OUTPATIENT
Start: 2023-09-08 | End: 2023-09-08 | Stop reason: HOSPADM

## 2023-09-08 RX ORDER — FENTANYL CITRATE 50 UG/ML
100 INJECTION, SOLUTION INTRAMUSCULAR; INTRAVENOUS
Status: COMPLETED | OUTPATIENT
Start: 2023-09-08 | End: 2023-09-08

## 2023-09-08 RX ADMIN — SODIUM CHLORIDE, SODIUM LACTATE, POTASSIUM CHLORIDE, AND CALCIUM CHLORIDE: 600; 310; 30; 20 INJECTION, SOLUTION INTRAVENOUS at 10:24

## 2023-09-08 RX ADMIN — GLYCOPYRROLATE 0.4 MG: 0.2 INJECTION INTRAMUSCULAR; INTRAVENOUS at 10:39

## 2023-09-08 RX ADMIN — TRANEXAMIC ACID 1000 MG: 100 INJECTION, SOLUTION INTRAVENOUS at 10:36

## 2023-09-08 RX ADMIN — PHENYLEPHRINE HYDROCHLORIDE 100 MCG: 0.1 INJECTION, SOLUTION INTRAVENOUS at 10:56

## 2023-09-08 RX ADMIN — PHENYLEPHRINE HYDROCHLORIDE 100 MCG: 0.1 INJECTION, SOLUTION INTRAVENOUS at 11:13

## 2023-09-08 RX ADMIN — LIDOCAINE HYDROCHLORIDE 1 ML: 10 INJECTION, SOLUTION INFILTRATION; PERINEURAL at 08:27

## 2023-09-08 RX ADMIN — DEXAMETHASONE SODIUM PHOSPHATE 4 MG: 4 INJECTION, SOLUTION INTRAMUSCULAR; INTRAVENOUS at 10:09

## 2023-09-08 RX ADMIN — MIDAZOLAM 2 MG: 1 INJECTION INTRAMUSCULAR; INTRAVENOUS at 10:08

## 2023-09-08 RX ADMIN — FENTANYL CITRATE 50 MCG: 50 INJECTION INTRAMUSCULAR; INTRAVENOUS at 10:09

## 2023-09-08 RX ADMIN — PHENYLEPHRINE HYDROCHLORIDE 100 MCG: 0.1 INJECTION, SOLUTION INTRAVENOUS at 11:06

## 2023-09-08 RX ADMIN — Medication 2000 MG: at 10:36

## 2023-09-08 RX ADMIN — SODIUM CHLORIDE, SODIUM LACTATE, POTASSIUM CHLORIDE, AND CALCIUM CHLORIDE: 600; 310; 30; 20 INJECTION, SOLUTION INTRAVENOUS at 10:58

## 2023-09-08 RX ADMIN — OXYCODONE HYDROCHLORIDE 5 MG: 5 TABLET ORAL at 13:38

## 2023-09-08 RX ADMIN — PROPOFOL 50 MG: 10 INJECTION, EMULSION INTRAVENOUS at 10:39

## 2023-09-08 RX ADMIN — SODIUM CHLORIDE, POTASSIUM CHLORIDE, SODIUM LACTATE AND CALCIUM CHLORIDE: 600; 310; 30; 20 INJECTION, SOLUTION INTRAVENOUS at 08:26

## 2023-09-08 RX ADMIN — MEPIVACAINE HYDROCHLORIDE 60 MG: 20 INJECTION, SOLUTION EPIDURAL; INFILTRATION at 10:35

## 2023-09-08 RX ADMIN — LIDOCAINE HYDROCHLORIDE 100 MG: 20 INJECTION, SOLUTION EPIDURAL; INFILTRATION; INTRACAUDAL; PERINEURAL at 10:39

## 2023-09-08 RX ADMIN — PROPOFOL 100 MCG/KG/MIN: 10 INJECTION, EMULSION INTRAVENOUS at 10:39

## 2023-09-08 RX ADMIN — ACETAMINOPHEN 1000 MG: 500 TABLET, FILM COATED ORAL at 08:25

## 2023-09-08 RX ADMIN — PHENYLEPHRINE HYDROCHLORIDE 100 MCG: 0.1 INJECTION, SOLUTION INTRAVENOUS at 11:21

## 2023-09-08 RX ADMIN — ROPIVACAINE HYDROCHLORIDE 20 ML: 2 INJECTION, SOLUTION EPIDURAL; INFILTRATION at 10:09

## 2023-09-08 RX ADMIN — OXYCODONE HYDROCHLORIDE 5 MG: 5 TABLET ORAL at 12:22

## 2023-09-08 ASSESSMENT — PAIN SCALES - GENERAL
PAINLEVEL_OUTOF10: 6
PAINLEVEL_OUTOF10: 3
PAINLEVEL_OUTOF10: 0
PAINLEVEL_OUTOF10: 0

## 2023-09-08 ASSESSMENT — KOOS JR
HOW SEVERE IS YOUR KNEE STIFFNESS AFTER FIRST WAKING IN MORNING: 1
STRAIGHTENING KNEE FULLY: 1
KOOS JR TOTAL INTERVAL SCORE: 68.284
GOING UP OR DOWN STAIRS: 1
BENDING TO THE FLOOR TO PICK UP OBJECT: 1
RISING FROM SITTING: 1
STANDING UPRIGHT: 1
TWISING OR PIVOTING ON KNEE: 1

## 2023-09-08 ASSESSMENT — PAIN DESCRIPTION - ORIENTATION: ORIENTATION: LEFT

## 2023-09-08 ASSESSMENT — PAIN DESCRIPTION - LOCATION: LOCATION: KNEE

## 2023-09-08 NOTE — ANESTHESIA PROCEDURE NOTES
Peripheral Block    Patient location during procedure: pre-op  Reason for block: post-op pain management and at surgeon's request  Start time: 9/8/2023 10:09 AM  End time: 9/8/2023 10:11 AM  Staffing  Performed: anesthesiologist   Anesthesiologist: Magdy Martinez MD  Preanesthetic Checklist  Completed: patient identified, IV checked, site marked, risks and benefits discussed, surgical/procedural consents, equipment checked, pre-op evaluation, timeout performed, anesthesia consent given, oxygen available and monitors applied/VS acknowledged  Peripheral Block   Patient position: supine  Prep: ChloraPrep  Provider prep: sterile gloves and mask  Patient monitoring: cardiac monitor, continuous pulse ox, frequent blood pressure checks, IV access, oxygen and responsive to questions  Block type: Femoral  Adductor canal  Laterality: left  Injection technique: single-shot  Guidance: ultrasound guided    Needle   Needle type: insulated echogenic nerve stimulator needle   Needle localization: ultrasound guidance  Assessment   Injection assessment: negative aspiration for heme, no paresthesia on injection, local visualized surrounding nerve on ultrasound and no intravascular symptoms  Paresthesia pain: none  Slow fractionated injection: yes  Hemodynamics: stable  Real-time US image taken/store: yes  Outcomes: uncomplicated and patient tolerated procedure well    Additional Notes  Ultrasound image taken and stored in chart   Medications Administered  ropivacaine (NAROPIN) injection 0.2% - Perineural   20 mL - 9/8/2023 10:09:00 AM  dexamethasone (DECADRON) injection 4 mg/mL - Perineural   4 mg - 9/8/2023 10:09:00 AM

## 2023-09-08 NOTE — DISCHARGE INSTRUCTIONS
[facility]      Patient Discharge Instructions    Evonne Mcrae / [id number] : 1940    Admitted 2023 Discharged: 2023     IF YOU HAVE ANY PROBLEMS ONCE YOU ARE AT HOME CALL THE FOLLOWING NUMBERS:   Main office number: [phone]      Medications    The medications you are to continue on are listed on the medication reconciliation sheet. Narcotic pain medications as well as supplemental iron can cause constipation. If this occurs try stopping the narcotic pain medication and/or the iron. It is important that you take the medication exactly as they are prescribed. Medications which increase your risk of blood clots are listed to stop for 5 weeks after surgery as well as medications or supplements which increase your risk of bleeding complications. Keep your medication in the bottles provided by the pharmacist and keep a list of the medication names, dosages, and times to be taken in your wallet. Do not take other medications without consulting your doctor. Important Information    Do NOT smoke as this will greatly increase your risk of infection! Resume your prehospital diet. If you have excessive nausea or vomitting call your doctor's office     Leg swelling and warmth is normal for 6 months after surgery. If you experience swelling in your leg elevate you leg while laying down with your toes above your heart. If you have sudden onset severe swelling with leg pain call our office. Use Munir Hose stockings until we see you in the office for your follow up appointment. The stitches deep inside take approximately 6 months to dissolve. There will be sharp shooting, stinging and burning pain. This is normal and will resolve between 3-6 months after surgery. Difficulty sleeping is normal following total Knee and Hip replacement. You may try melatonin, an over-the-counter sleep aid or benadryl to help with sleep.  Most patients will resume sleeping through the night 8

## 2023-09-08 NOTE — PERIOP NOTE
Pt doing well in PACU. Pain and nausea controlled at this time. Pt VSS, Pt is drinking pepsi and tolerating it well. Will continue to monitor in PACU.

## 2023-09-08 NOTE — ANESTHESIA PROCEDURE NOTES
Spinal Block    Patient location during procedure: OR  End time: 9/8/2023 10:38 AM  Reason for block: primary anesthetic  Staffing  Performed: anesthesiologist   Anesthesiologist: Portia Fleming MD  Spinal Block  Patient position: sitting  Prep: ChloraPrep  Patient monitoring: cardiac monitor, continuous pulse ox, frequent blood pressure checks and oxygen  Approach: midline  Location: L3/L4  Provider prep: sterile gloves and mask  Local infiltration: lidocaine  Needle  Needle type:  Marie   Needle gauge: 25 G  Assessment  Attempts: 1  Hemodynamics: stable  Preanesthetic Checklist  Completed: patient identified, IV checked, site marked, risks and benefits discussed, surgical/procedural consents, equipment checked, pre-op evaluation, timeout performed, anesthesia consent given, oxygen available and monitors applied/VS acknowledged

## 2023-09-08 NOTE — ANESTHESIA PRE PROCEDURE
BEART, [id number]     Type & Screen (If Applicable):  No results found for: LABABO, LABRH    Drug/Infectious Status (If Applicable):  No results found for: HIV, HEPCAB    COVID-19 Screening (If Applicable):   Lab Results   Component Value Date/Time    COVID19 Not Detected 01/31/2022 12:00 AM    COVID19 Performed 01/31/2022 12:00 AM           Anesthesia Evaluation  Patient summary reviewed and Nursing notes reviewed no history of anesthetic complications:   Airway: Mallampati: II  TM distance: >3 FB   Neck ROM: full  Mouth opening: > = 3 FB   Dental: normal exam         Pulmonary:Negative Pulmonary ROS breath sounds clear to auscultation                             Cardiovascular:  Exercise tolerance: good (>4 METS),   (+) hypertension:, hyperlipidemia    (-) murmur      Rhythm: regular  Rate: normal                    Neuro/Psych:   Negative Neuro/Psych ROS              GI/Hepatic/Renal:   (+) GERD: well controlled,           Endo/Other:    (+) : arthritis:., malignancy/cancer (breast). Abdominal:             Vascular: negative vascular ROS. Other Findings:           Anesthesia Plan      spinal     ASA 2     (PNB + spinal + TIVA)        Anesthetic plan and risks discussed with patient.               Post-op pain plan if not by surgeon: single peripheral nerve block            Juanita Vargas MD   9/8/2023

## 2023-09-08 NOTE — CARE COORDINATION
09/08/23 1505   Service Assessment   Patient Orientation Alert and Oriented;Person;Place;Situation;Self   Cognition Alert   History Provided By Patient   Primary [facility]   PCP Verified by CM Yes   Prior Functional Level Independent in ADLs/IADLs   Current Functional Level Independent in ADLs/IADLs   Can patient return to prior living arrangement Yes   Ability to make needs known: Good   Family able to assist with home care needs: Yes   Would you like for me to discuss the discharge plan with any other family members/significant others, and if so, who? Yes   Financial Resources Medicare   CM/SW Referral Other (see comment)  (d/c planning)   Social/Functional History   Lives With Alone   Type of Home House   ADL Assistance Independent   Ambulation Assistance Independent   Transfer Assistance Independent   Active  Yes   Mode of Transportation Car   Discharge Planning   Type of [address] Prior To Admission [facility] [place] [facility]   Current DME Prior to [employer]; Bedside [employer]   Potential Assistance Needed Home Care   DME Ordered? No   Potential Assistance Purchasing Medications No   Type of Home Care Services None   Patient expects to be discharged to: [facility]   Transition of Care Consult (CM Consult) Home Health;Discharge Planning   Internal [address] Discharge PT; Home Health   Mode of Transport at Discharge Other (see comment)  (family)   Confirm Follow Up Transport Family   Condition of Participation: Discharge Planning   The Plan for Transition of Care is related to the following treatment goals: Home with family and [facility]   The Patient and/or Patient Representative was provided with a Choice of Provider? Patient   The Patient and/Or Patient Representative agree with the Discharge Plan?  Yes   Freedom of Choice list was provided with basic

## 2023-09-08 NOTE — PROGRESS NOTES
TRANSFER - IN REPORT:    Verbal report received from [facility] on Joey Arm  being received from PACU for routine post-op      Report consisted of patient's Situation, Background, Assessment and   Recommendations(SBAR). Information from the following report(s) Intake/Output and MAR was reviewed with the receiving nurse. Opportunity for questions and clarification was provided.

## 2023-09-08 NOTE — PERIOP NOTE
TRANSFER - OUT REPORT:    Verbal report given to Ivin Gilford, RN on Karlie Campo  being transferred to [address] [place] [address] for routine post-op       Report consisted of patient's Situation, Background, Assessment and   Recommendations(SBAR). Information from the following report(s) Nurse Handoff Report was reviewed with the receiving nurse. Lines:   Peripheral IV 09/08/23 Posterior;Right Hand (Active)   Site Assessment Clean, dry & intact 09/08/23 1211   Line Status Infusing 09/08/23 [id number] ERUCES Connections checked and tightened 09/08/23 1211   Phlebitis Assessment No symptoms 09/08/23 1211   Infiltration Assessment 0 09/08/23 1211   Alcohol Cap Used No 09/08/23 1211   Dressing Status Clean, dry & intact 09/08/23 1211   Dressing Type Transparent 09/08/23 1211        Opportunity for questions and clarification was provided.       Patient transported with:  O2 @ room air lpm

## 2023-09-09 ENCOUNTER — HOME CARE VISIT (OUTPATIENT)
Dept: SCHEDULING | Facility: HOME HEALTH | Age: 83
End: 2023-09-09

## 2023-09-09 DIAGNOSIS — R11.0 NAUSEA: Primary | ICD-10-CM

## 2023-09-09 PROCEDURE — G0151 HHCP-SERV OF PT,EA 15 MIN: HCPCS

## 2023-09-09 PROCEDURE — 0221000100 HH NO PAY CLAIM PROCEDURE

## 2023-09-09 RX ORDER — PROMETHAZINE HYDROCHLORIDE 12.5 MG/1
12.5 TABLET ORAL 4 TIMES DAILY PRN
Qty: 3 TABLET | Refills: 0 | Status: SHIPPED | OUTPATIENT
Start: 2023-09-09 | End: 2023-09-16

## 2023-09-09 NOTE — PROGRESS NOTES
HH called - patient had increased nausea with oxycodone and the zofran is not helping with N and V. Patient has vomited 1 time. [address] [facility],[address] nurse will get patient to take 1000 mg tylenol TID and try to decrease oxycodone to 5 mg BID. Phenergan 12.5 mg called in try and help some of this N and V. Only 3 pills called in.     Jovita Fernandez PA-C

## 2023-09-10 VITALS
OXYGEN SATURATION: 98 % | DIASTOLIC BLOOD PRESSURE: 76 MMHG | TEMPERATURE: 97.9 F | HEART RATE: 66 BPM | SYSTOLIC BLOOD PRESSURE: 132 MMHG | RESPIRATION RATE: 16 BRPM

## 2023-09-10 ASSESSMENT — ENCOUNTER SYMPTOMS
PAIN LOCATION - PAIN QUALITY: ACHE
DYSPNEA ACTIVITY LEVEL: AFTER AMBULATING 10 - 20 FT

## 2023-09-12 ENCOUNTER — HOME CARE VISIT (OUTPATIENT)
Dept: SCHEDULING | Facility: HOME HEALTH | Age: 83
End: 2023-09-12

## 2023-09-12 VITALS
OXYGEN SATURATION: 98 % | HEART RATE: 78 BPM | RESPIRATION RATE: 18 BRPM | TEMPERATURE: 98 F | SYSTOLIC BLOOD PRESSURE: 110 MMHG | DIASTOLIC BLOOD PRESSURE: 60 MMHG

## 2023-09-12 PROCEDURE — G0151 HHCP-SERV OF PT,EA 15 MIN: HCPCS

## 2023-09-12 ASSESSMENT — ENCOUNTER SYMPTOMS: PAIN LOCATION - PAIN QUALITY: ACHE

## 2023-09-13 ENCOUNTER — HOME CARE VISIT (OUTPATIENT)
Dept: SCHEDULING | Facility: HOME HEALTH | Age: 83
End: 2023-09-13

## 2023-09-13 ENCOUNTER — TELEPHONE (OUTPATIENT)
Dept: ORTHOPEDIC SURGERY | Age: 83
End: 2023-09-13

## 2023-09-13 VITALS
TEMPERATURE: 98.2 F | SYSTOLIC BLOOD PRESSURE: 118 MMHG | HEART RATE: 92 BPM | DIASTOLIC BLOOD PRESSURE: 68 MMHG | OXYGEN SATURATION: 98 %

## 2023-09-13 DIAGNOSIS — M17.12 OSTEOARTHRITIS OF LEFT KNEE, UNSPECIFIED OSTEOARTHRITIS TYPE: Primary | ICD-10-CM

## 2023-09-13 PROCEDURE — G0157 HHC PT ASSISTANT EA 15: HCPCS

## 2023-09-13 RX ORDER — TRAMADOL HYDROCHLORIDE 50 MG/1
50 TABLET ORAL
Qty: 40 TABLET | Refills: 0 | Status: SHIPPED | OUTPATIENT
Start: 2023-09-13 | End: 2023-09-20

## 2023-09-13 NOTE — TELEPHONE ENCOUNTER
Hannah Ye does not want to take Norco or Oxy for pain relief since it is too strong and the Oxy has caused her to vomit despite taking the Zofran. She prefer we send in Tramadol so I will talk with Shawna Andrew or [facility] about this and get it sent into her Pharmacy.

## 2023-09-13 NOTE — TELEPHONE ENCOUNTER
Nigel Pittman is asking if you can give the patient a call to discuss pain management. She has stopped taking oxycodone and is just taking Tylenol. She is wondering if there is something better than Tylenol but not as strong as Oxycodone.

## 2023-09-13 NOTE — TELEPHONE ENCOUNTER
Tramadol was sent in today and they need to verify if she should be on this instead of oxycodone.  Please call the pharmacy

## 2023-09-13 NOTE — TELEPHONE ENCOUNTER
Spoke with [employer] she will fill the Tramadol the pharmacy just wanted to make sure it was fine to fill

## 2023-09-15 ENCOUNTER — HOME CARE VISIT (OUTPATIENT)
Dept: SCHEDULING | Facility: HOME HEALTH | Age: 83
End: 2023-09-15

## 2023-09-15 ENCOUNTER — HOSPITAL ENCOUNTER (OUTPATIENT)
Dept: ULTRASOUND IMAGING | Age: 83
Discharge: HOME | End: 2023-09-18
Attending: ORTHOPAEDIC SURGERY

## 2023-09-15 ENCOUNTER — HOME CARE VISIT (OUTPATIENT)
Dept: HOME HEALTH SERVICES | Facility: HOME HEALTH | Age: 83
End: 2023-09-15

## 2023-09-15 ENCOUNTER — TELEPHONE (OUTPATIENT)
Dept: ORTHOPEDIC SURGERY | Age: 83
End: 2023-09-15

## 2023-09-15 VITALS
SYSTOLIC BLOOD PRESSURE: 128 MMHG | TEMPERATURE: 98.7 F | DIASTOLIC BLOOD PRESSURE: 62 MMHG | HEART RATE: 88 BPM | OXYGEN SATURATION: 98 %

## 2023-09-15 DIAGNOSIS — M79.662 PAIN AND SWELLING OF LEFT LOWER LEG: ICD-10-CM

## 2023-09-15 DIAGNOSIS — M79.662 PAIN AND SWELLING OF LEFT LOWER LEG: Primary | ICD-10-CM

## 2023-09-15 DIAGNOSIS — M17.12 OSTEOARTHRITIS OF LEFT KNEE, UNSPECIFIED OSTEOARTHRITIS TYPE: ICD-10-CM

## 2023-09-15 DIAGNOSIS — M17.12 OSTEOARTHRITIS OF LEFT KNEE, UNSPECIFIED OSTEOARTHRITIS TYPE: Primary | ICD-10-CM

## 2023-09-15 DIAGNOSIS — M79.89 PAIN AND SWELLING OF LEFT LOWER LEG: ICD-10-CM

## 2023-09-15 DIAGNOSIS — M79.89 PAIN AND SWELLING OF LEFT LOWER LEG: Primary | ICD-10-CM

## 2023-09-15 PROCEDURE — G0157 HHC PT ASSISTANT EA 15: HCPCS

## 2023-09-15 RX ORDER — HYDROCODONE BITARTRATE AND ACETAMINOPHEN 5; 325 MG/1; MG/1
1 TABLET ORAL EVERY 6 HOURS PRN
Qty: 20 TABLET | Refills: 0 | Status: SHIPPED | OUTPATIENT
Start: 2023-09-15 | End: 2023-09-20

## 2023-09-15 NOTE — TELEPHONE ENCOUNTER
William Hinojosa called and patient is having a lot of calf pain and swelling.  She is also having 7/10 pain- sending for U/S and changing medication to [address] for her to try

## 2023-09-19 ENCOUNTER — HOME CARE VISIT (OUTPATIENT)
Dept: HOME HEALTH SERVICES | Facility: HOME HEALTH | Age: 83
End: 2023-09-19

## 2023-09-19 ENCOUNTER — HOME CARE VISIT (OUTPATIENT)
Dept: SCHEDULING | Facility: HOME HEALTH | Age: 83
End: 2023-09-19

## 2023-09-19 VITALS
SYSTOLIC BLOOD PRESSURE: 118 MMHG | HEART RATE: 72 BPM | OXYGEN SATURATION: 98 % | TEMPERATURE: 98.2 F | DIASTOLIC BLOOD PRESSURE: 74 MMHG

## 2023-09-19 PROCEDURE — G0157 HHC PT ASSISTANT EA 15: HCPCS

## 2023-09-19 ASSESSMENT — ENCOUNTER SYMPTOMS: PAIN LOCATION - PAIN QUALITY: ACHE

## 2023-09-20 DIAGNOSIS — M17.12 OSTEOARTHRITIS OF LEFT KNEE, UNSPECIFIED OSTEOARTHRITIS TYPE: Primary | ICD-10-CM

## 2023-09-21 ENCOUNTER — HOME CARE VISIT (OUTPATIENT)
Dept: SCHEDULING | Facility: HOME HEALTH | Age: 83
End: 2023-09-21

## 2023-09-21 VITALS
OXYGEN SATURATION: 98 % | DIASTOLIC BLOOD PRESSURE: 82 MMHG | HEART RATE: 80 BPM | SYSTOLIC BLOOD PRESSURE: 110 MMHG | TEMPERATURE: 98.4 F

## 2023-09-21 PROCEDURE — G0157 HHC PT ASSISTANT EA 15: HCPCS

## 2023-09-21 ASSESSMENT — ENCOUNTER SYMPTOMS: PAIN LOCATION - PAIN QUALITY: SORE, THROBBING

## 2023-09-25 ENCOUNTER — HOME CARE VISIT (OUTPATIENT)
Dept: HOME HEALTH SERVICES | Facility: HOME HEALTH | Age: 83
End: 2023-09-25
Payer: MEDICARE

## 2023-09-25 ENCOUNTER — TELEPHONE (OUTPATIENT)
Dept: ORTHOPEDIC SURGERY | Age: 83
End: 2023-09-25

## 2023-09-25 ENCOUNTER — HOME CARE VISIT (OUTPATIENT)
Dept: SCHEDULING | Facility: HOME HEALTH | Age: 83
End: 2023-09-25
Payer: MEDICARE

## 2023-09-25 VITALS
TEMPERATURE: 98.3 F | DIASTOLIC BLOOD PRESSURE: 68 MMHG | SYSTOLIC BLOOD PRESSURE: 118 MMHG | RESPIRATION RATE: 17 BRPM | HEART RATE: 80 BPM | OXYGEN SATURATION: 98 %

## 2023-09-25 PROCEDURE — G0157 HHC PT ASSISTANT EA 15: HCPCS

## 2023-09-25 ASSESSMENT — ENCOUNTER SYMPTOMS: PAIN LOCATION - PAIN QUALITY: ACHY, THROBBING

## 2023-09-25 NOTE — TELEPHONE ENCOUNTER
Arthur Mujica is calling to report that she took several pictures of her knee this morning. She has a tiny spot on the posterior side of her knee and yesterday when she rubbed it a lot of clear liquid came out. Please call Debi.

## 2023-09-25 NOTE — TELEPHONE ENCOUNTER
Spoke to PT - knee looks great, little bump is on the posterior side and does not look infected. Seemed to have cleared up. They are going to monitor for now.

## 2023-09-28 ENCOUNTER — HOME CARE VISIT (OUTPATIENT)
Dept: SCHEDULING | Facility: HOME HEALTH | Age: 83
End: 2023-09-28
Payer: MEDICARE

## 2023-09-28 VITALS
RESPIRATION RATE: 18 BRPM | DIASTOLIC BLOOD PRESSURE: 78 MMHG | OXYGEN SATURATION: 98 % | HEART RATE: 78 BPM | SYSTOLIC BLOOD PRESSURE: 136 MMHG | TEMPERATURE: 98 F

## 2023-09-28 PROCEDURE — G0151 HHCP-SERV OF PT,EA 15 MIN: HCPCS

## 2023-09-28 ASSESSMENT — ENCOUNTER SYMPTOMS: PAIN LOCATION - PAIN QUALITY: ACHE,

## 2023-10-04 ENCOUNTER — OFFICE VISIT (OUTPATIENT)
Dept: ORTHOPEDIC SURGERY | Age: 83
End: 2023-10-04

## 2023-10-04 DIAGNOSIS — Z96.652 STATUS POST TOTAL LEFT KNEE REPLACEMENT: Primary | ICD-10-CM

## 2023-10-04 NOTE — PROGRESS NOTES
Name: Efra Pascal  YOB: 1940  Gender: female  MRN: [id number]      Current Outpatient Medications:     polyethylene glycol (GLYCOLAX) 17 g packet, Take 17 g by mouth 2 times daily. mix in 8 oz water, Disp: , Rfl:     acetaminophen (TYLENOL) 500 MG CAPS capsule, Take 1,000 mg by mouth every 6 hours as needed for Pain (breakthrough pain). , Disp: , Rfl:     aspirin 81 MG EC tablet, Take 1 tablet by mouth 2 times daily, Disp: 70 tablet, Rfl: 0    ondansetron (ZOFRAN) 4 MG tablet, Take 1 tablet by mouth every 8 hours as needed for Nausea or Vomiting, Disp: 20 tablet, Rfl: 0    amLODIPine-benazepril (LOTREL) 5-40 MG per capsule, Take 1 capsule by mouth daily, Disp: , Rfl:     omeprazole (PRILOSEC) 20 MG delayed release capsule, Take 1 capsule by mouth daily, Disp: , Rfl:     tamoxifen (NOLVADEX) 20 MG tablet, Take 1 tablet by mouth daily, Disp: , Rfl:   No Known Allergies    Post-op right TKA. Slow to progress. She seen the progress faster with the right knee. Her friend seems at length about the same. She has a little bit of swelling about the left knee. Had ultrasound 2 weeks ago but no clot was noted. The patient returns now post total knee arthroplasty. Their pain is diminishing and the wound healing. Their range of motion is improving. Radiographs: AP/Lateral and sunrise of the right knee taken in the office today reveal a good bone, prosthetic appearance. The patella is balanced. Radiographic Diagnosis:  Normal post-operative total knee. Recommendations:  Reviewed x-ray findings with the patient. They are to increase their weight bearing status as tolerated. A cane can be used in transition. They are to follow the routine rehabilitation protocol. They are to wean from their pain medications as tolerated. She will stop the aspirin. Work on elevation. REYNALDO hose are recommended.   She should expect continued improvement we will see her back in 5 months Pharmacy is looking for a refill on Sertraline 100mg with the sig: take 1 tab daily. Thanks!

## 2024-03-06 ENCOUNTER — OFFICE VISIT (OUTPATIENT)
Dept: ORTHOPEDIC SURGERY | Age: 84
End: 2024-03-06
Payer: MEDICARE

## 2024-03-06 DIAGNOSIS — Z96.652 STATUS POST TOTAL LEFT KNEE REPLACEMENT: Primary | ICD-10-CM

## 2024-03-06 PROCEDURE — 99213 OFFICE O/P EST LOW 20 MIN: CPT | Performed by: ORTHOPAEDIC SURGERY

## 2024-03-06 PROCEDURE — 1123F ACP DISCUSS/DSCN MKR DOCD: CPT | Performed by: ORTHOPAEDIC SURGERY

## 2024-03-06 PROCEDURE — 1036F TOBACCO NON-USER: CPT | Performed by: ORTHOPAEDIC SURGERY

## 2024-03-06 PROCEDURE — G8400 PT W/DXA NO RESULTS DOC: HCPCS | Performed by: ORTHOPAEDIC SURGERY

## 2024-03-06 PROCEDURE — G8420 CALC BMI NORM PARAMETERS: HCPCS | Performed by: ORTHOPAEDIC SURGERY

## 2024-03-06 PROCEDURE — G8428 CUR MEDS NOT DOCUMENT: HCPCS | Performed by: ORTHOPAEDIC SURGERY

## 2024-03-06 PROCEDURE — 1090F PRES/ABSN URINE INCON ASSESS: CPT | Performed by: ORTHOPAEDIC SURGERY

## 2024-03-06 PROCEDURE — G8484 FLU IMMUNIZE NO ADMIN: HCPCS | Performed by: ORTHOPAEDIC SURGERY

## 2024-03-06 NOTE — PROGRESS NOTES
Name: Jeanette Gordon  YOB: 1940  Gender: female  MRN: [id number]      Current Outpatient Medications:     polyethylene glycol (GLYCOLAX) 17 g packet, Take 17 g by mouth 2 times daily. mix in 8 oz water, Disp: , Rfl:     acetaminophen (TYLENOL) 500 MG CAPS capsule, Take 1,000 mg by mouth every 6 hours as needed for Pain (breakthrough pain)., Disp: , Rfl:     aspirin 81 MG EC tablet, Take 1 tablet by mouth 2 times daily, Disp: 70 tablet, Rfl: 0    ondansetron (ZOFRAN) 4 MG tablet, Take 1 tablet by mouth every 8 hours as needed for Nausea or Vomiting, Disp: 20 tablet, Rfl: 0    amLODIPine-benazepril (LOTREL) 5-40 MG per capsule, Take 1 capsule by mouth daily, Disp: , Rfl:     omeprazole (PRILOSEC) 20 MG delayed release capsule, Take 1 capsule by mouth daily, Disp: , Rfl:     tamoxifen (NOLVADEX) 20 MG tablet, Take 1 tablet by mouth daily, Disp: , Rfl:   No Known Allergies    CC: Post-op left TKA    HPI: The patient is here now 6 months post total knee arthroplasty.  They report that they are functioning well and denies knee pain, swelling, or instability.  They report occasional soreness.  No other new complaints.    PMH: Reviewed and unchanged    ROS:  As per HPI; pertinent positives and negatives are addressed with the patient, particularly, those related to musculoskeletal concerns.  Non-orthopaedic concerns were referred back to the primary care physician.    PE:  left Knee  Patient is comfortable and in no distress.  ROM: 0 to 125 degrees  AP translation: 4 mm  M-L laxity: 2 degrees  Alignment: 4 degrees of valgus  Quadriceps strength: excellent  Gait: no limp  Incision: well healed  She has a varicosity lateral to the incision site that is pronounced little bit warm but no pain    Radiographs: AP/Lateral and sunrise of the left knee taken in the office today reveal a good bone, prosthetic appearance.  The patella is balanced.           Radiographic Diagnosis:  Stable total knee

## 2024-09-18 ENCOUNTER — OFFICE VISIT (OUTPATIENT)
Dept: ORTHOPEDIC SURGERY | Age: 84
End: 2024-09-18
Payer: MEDICARE

## 2024-09-18 DIAGNOSIS — Z96.651 STATUS POST RIGHT KNEE REPLACEMENT: ICD-10-CM

## 2024-09-18 DIAGNOSIS — Z96.652 STATUS POST TOTAL LEFT KNEE REPLACEMENT: Primary | ICD-10-CM

## 2024-09-18 PROCEDURE — G8400 PT W/DXA NO RESULTS DOC: HCPCS | Performed by: PHYSICIAN ASSISTANT

## 2024-09-18 PROCEDURE — 99213 OFFICE O/P EST LOW 20 MIN: CPT | Performed by: PHYSICIAN ASSISTANT

## 2024-09-18 PROCEDURE — G8421 BMI NOT CALCULATED: HCPCS | Performed by: PHYSICIAN ASSISTANT

## 2024-09-18 PROCEDURE — 1090F PRES/ABSN URINE INCON ASSESS: CPT | Performed by: PHYSICIAN ASSISTANT

## 2024-09-18 PROCEDURE — G8428 CUR MEDS NOT DOCUMENT: HCPCS | Performed by: PHYSICIAN ASSISTANT

## 2024-09-18 PROCEDURE — 1036F TOBACCO NON-USER: CPT | Performed by: PHYSICIAN ASSISTANT

## 2024-09-18 PROCEDURE — 1123F ACP DISCUSS/DSCN MKR DOCD: CPT | Performed by: PHYSICIAN ASSISTANT

## (undated) DEVICE — SOLUTION IV 1000ML 0.9% SOD CHL

## (undated) DEVICE — SYR LR LCK 1ML GRAD NSAF 30ML --

## (undated) DEVICE — BIPOLAR SEALER 23-112-1 AQM 6.0: Brand: AQUAMANTYS ®

## (undated) DEVICE — GOWN,REINF,POLY,ECL,PP SLV,XL: Brand: MEDLINE

## (undated) DEVICE — BLADE SURG SAW S STL NAR OSC W/ SERR EDGE DISP

## (undated) DEVICE — KIT PROC KNE TRACKING PK/1 -- VIZADISC MAKO

## (undated) DEVICE — NEEDLE HYPO 18GA L1.5IN PNK S STL HUB POLYPR SHLD REG BVL

## (undated) DEVICE — UTILITY MARKER,BLACK WITH LABELS: Brand: DEVON

## (undated) DEVICE — NEEDLE HYPO 21GA L1.5IN GRN POLYPR HUB S STL REG BVL STR

## (undated) DEVICE — PIN BNE FIX TEMP L110MM DIA4MM MAKO

## (undated) DEVICE — SYR 50ML LR LCK 1ML GRAD NSAF --

## (undated) DEVICE — TOTAL KNEE DR KAVOLUS: Brand: MEDLINE INDUSTRIES, INC.

## (undated) DEVICE — SOLUTION IRRIG 1000ML STRL H2O USP PLAS POUR BTL

## (undated) DEVICE — Device

## (undated) DEVICE — KIT TRK KNEE PROC VIZADISC

## (undated) DEVICE — BUTTON SWITCH PENCIL BLADE ELECTRODE, HOLSTER: Brand: EDGE

## (undated) DEVICE — SOLUTION IRRIG 3000ML 0.9% SOD CHL USP UROMATIC PLAS CONT

## (undated) DEVICE — SOLUTION IRRIG 1000ML 0.9% SOD CHL USP POUR PLAS BTL

## (undated) DEVICE — SOLUTION IV 250ML 0.9% SOD CHL PH 5 INJ USP VIAFLX PLAS

## (undated) DEVICE — PAD,NON-ADHERENT,3X8,STERILE,LF,1/PK: Brand: MEDLINE

## (undated) DEVICE — HANDPIECE SET WITH COAXIAL HIGH FLOW TIP AND SUCTION TUBE: Brand: INTERPULSE

## (undated) DEVICE — SUTURE VCRL SZ 1 L36IN ABSRB UD CTX L48MM 1/2 CIR J977H

## (undated) DEVICE — DRSG AQUACEL SURG 3.5 X 10IN -- CONVERT TO ITEM 370183

## (undated) DEVICE — PIN BNE FIX TEMP L140MM DIA4MM MAKO

## (undated) DEVICE — BLADE SAW LG BNE 90X19X1.27 MM PARL STRYKR NS EZ BLADE DISP

## (undated) DEVICE — SUTURE PDS II SZ 1 L96IN ABSRB VLT TP-1 L65MM 1/2 CIR Z880G

## (undated) DEVICE — SUTURE VCRL SZ 1 L27IN ABSRB UD L36MM CP-1 1/2 CIR REV CUT J268H

## (undated) DEVICE — STOCKINETTE IMPERV 12X48IN STE -- MEDICHOICE

## (undated) DEVICE — YANKAUER,FLEXIBLE HANDLE,REGLR CAPACITY: Brand: MEDLINE INDUSTRIES, INC.

## (undated) DEVICE — STERILE PRESSURE PROTECTOR PAD® FOR DE MAYO UNIVERSAL DISTRACTOR® (10/CASE): Brand: DE MAYO UNIVERSAL DISTRACTOR®

## (undated) DEVICE — DRESSING HYDROFIBER AQUACEL AG ADVANTAGE 3.5X12 IN

## (undated) DEVICE — SOLUTION IRRIG 3000ML 0.9% SOD CHL FLX CONT 0797208] ICU MEDICAL INC]

## (undated) DEVICE — Z DISCONTINUED PER MEDLINE USE 2741944 DRESSING AQUACEL 12 IN SURG W9XL30CM SIL CVR WTRPRF VIR BACT BARR ANTIMIC

## (undated) DEVICE — DRAPE,TOP,102X53,STERILE: Brand: MEDLINE

## (undated) DEVICE — SUTURE VCRL + SZ 1-0 L36IN ABSRB UD CTX 1/2 CIR TAPR PNT VCP977H

## (undated) DEVICE — KIT DRP FOR RIO ROBOTIC ARM ASST SYS

## (undated) DEVICE — HOOD: Brand: FLYTE

## (undated) DEVICE — 3M™ STERI-DRAPE™ INSTRUMENT POUCH 1018: Brand: STERI-DRAPE™

## (undated) DEVICE — STRYKER PERFORMANCE SERIES SAGITTAL BLADE: Brand: STRYKER PERFORMANCE SERIES

## (undated) DEVICE — REM POLYHESIVE ADULT PATIENT RETURN ELECTRODE: Brand: VALLEYLAB

## (undated) DEVICE — TRAY PREP DRY W/ PREM GLV 2 APPL 6 SPNG 2 UNDPD 1 OVERWRAP

## (undated) DEVICE — KIT INT FIX FEM TIB CKPT MAKOPLASTY

## (undated) DEVICE — SUTURE MCRYL SZ 2-0 L27IN ABSRB UD CP-1 1 L36MM 1/2 CIR REV Y266H

## (undated) DEVICE — STERILE PVP: Brand: MEDLINE INDUSTRIES, INC.

## (undated) DEVICE — SYR 10ML CTRL LR LCK NSAF LF --

## (undated) DEVICE — DRAPE SHT 3 QTR PROXIMA 53X77 --

## (undated) DEVICE — GUIDEPIN ORTHOPEDIC NAVIGATION 4X110 MM 2P SCREW STRL

## (undated) DEVICE — SOLUTION IV 250ML 0.9% SOD CHL CLR INJ FLX BG CONT PRT CLSR

## (undated) DEVICE — PREP CHLORAPREP 10.5 ML ORG --

## (undated) DEVICE — GUIDEPIN ORTHOPEDIC NAVIGATION 4X140 MM 2P SCREW STRL

## (undated) DEVICE — CORD RETRCT SIL